# Patient Record
Sex: FEMALE | Race: WHITE | Employment: FULL TIME | URBAN - METROPOLITAN AREA
[De-identification: names, ages, dates, MRNs, and addresses within clinical notes are randomized per-mention and may not be internally consistent; named-entity substitution may affect disease eponyms.]

---

## 2019-02-05 ENCOUNTER — TELEPHONE (OUTPATIENT)
Dept: SCHEDULING | Facility: IMAGING CENTER | Age: 49
End: 2019-02-05

## 2019-03-18 ENCOUNTER — OFFICE VISIT (OUTPATIENT)
Dept: INTERNAL MEDICINE | Facility: MEDICAL CENTER | Age: 49
End: 2019-03-18
Payer: COMMERCIAL

## 2019-03-18 VITALS
HEIGHT: 67 IN | HEART RATE: 70 BPM | OXYGEN SATURATION: 98 % | SYSTOLIC BLOOD PRESSURE: 106 MMHG | DIASTOLIC BLOOD PRESSURE: 60 MMHG | WEIGHT: 171 LBS | TEMPERATURE: 98.2 F | BODY MASS INDEX: 26.84 KG/M2

## 2019-03-18 DIAGNOSIS — Z11.51 SCREENING FOR HUMAN PAPILLOMAVIRUS (HPV): ICD-10-CM

## 2019-03-18 DIAGNOSIS — Z87.898 HISTORY OF PREDIABETES: ICD-10-CM

## 2019-03-18 DIAGNOSIS — Z12.31 BREAST CANCER SCREENING BY MAMMOGRAM: ICD-10-CM

## 2019-03-18 DIAGNOSIS — E03.9 HYPOTHYROIDISM, UNSPECIFIED TYPE: ICD-10-CM

## 2019-03-18 PROCEDURE — 99204 OFFICE O/P NEW MOD 45 MIN: CPT | Mod: GC | Performed by: INTERNAL MEDICINE

## 2019-03-18 RX ORDER — LEVOTHYROXINE SODIUM 137 UG/1
137 TABLET ORAL
Qty: 30 TAB | Refills: 2 | Status: SHIPPED | OUTPATIENT
Start: 2019-03-18

## 2019-03-18 RX ORDER — LEVOTHYROXINE SODIUM 0.12 MG/1
125 TABLET ORAL
Qty: 30 TAB | Refills: 2 | Status: SHIPPED | OUTPATIENT
Start: 2019-03-18

## 2019-03-18 ASSESSMENT — PATIENT HEALTH QUESTIONNAIRE - PHQ9: CLINICAL INTERPRETATION OF PHQ2 SCORE: 0

## 2019-03-18 ASSESSMENT — PAIN SCALES - GENERAL: PAINLEVEL: NO PAIN

## 2019-03-18 NOTE — PROGRESS NOTES
"New Patient to Establish      CC:   PCP switch      HPI:   48-year-old female with hypothyroidism diagnosed 19 years ago after her first pregnancy, when the patient had difficulty getting pregnant again, was started on levothyroxine and had a 2nd pregnancy without any complications. Reports taking her levothyroxine on an empty stomach every morning and waiting 30-60 minutes to eat or drink. Has been levothyroxine 0.125 and 0.137 mcg alternatingly every other day for the past year. Reports having relatively low energy chronically. Endorses history of brittle nails and hair fall, notes some worsening of these symptoms over the past 6 months, had a bout of constipation several weeks ago that has now resolved.    Declines the flu shot.  Denies any illness for the past several years except for cystitis in 2016.  LMP 3/18/19, endorses regular periods every 4 weeks, denies menorrhagia or metrorrhagia, last pap smear was \"a couple of years ago\" and normal, last mammogram 5-8 years ago and normal.  Sees a chiropractor PRN for occasional hip discomfort.  Has a 20-year-old son and an 18-year-old daughter, works in [employer] at the [employer], lives in a house in [place] with her  and 2 children.      ROS:  Negative except for chronic low energy that does not interfere significantly with her daily activities.  Denies low mood, abnormal bleeding or bruising, malaise, sleep disturbance, muscular bone pain, melena, hematochezia, dysuria, hematuria, cough, dysphagia, abnormal lumps or bumps and numbness or tingling.      Patient Active Problem List    Diagnosis Date Noted   • Breast cancer screening by mammogram 03/19/2019   • Hypothyroidism 03/19/2019   • History of prediabetes 03/19/2019   • Screening for human papillomavirus (HPV) 03/19/2019       History reviewed. No pertinent past medical history.    History reviewed. No pertinent surgical history.    Current Outpatient Prescriptions   Medication Sig Dispense Refill   • levothyroxine " "(SYNTHROID) 137 MCG Tab Take 1 Tab by mouth Every morning on an empty stomach. 30 Tab 2   • levothyroxine (SYNTHROID) 125 MCG Tab Take 1 Tab by mouth Every morning on an empty stomach. 30 Tab 2   • LEVOTHYROXINE SODIUM PO Take  by mouth.     • nitrofurantoin monohydr macro (MACROBID) 100 MG Cap Take 1 Cap by mouth 2 times a day. (Patient not taking: Reported on 3/18/2019) 20 Cap 0     No current facility-administered medications for this visit.        Allergies as of 03/18/2019   • (No Known Allergies)       Social History     Social History   • Marital status:      Spouse name: N/A   • Number of children: 2   • Years of education: N/A     Occupational History   • [employer]     Social History Main Topics   • Smoking status: Never Smoker   • Smokeless tobacco: Never Used   • Alcohol use No   • Drug use: No   • Sexual activity: Yes     Partners: Male     Other Topics Concern   • Sleep Concern No   • Stress Concern No   • Special Diet No   • Exercise No     Social History Narrative   • No narrative on file       Family History   Problem Relation Age of Onset   • Heart Attack Mother    • No Known Problems Father        Physical Exam:     /60 (BP Location: Right arm, Patient Position: Sitting, BP Cuff Size: Adult)   Pulse 70   Temp 36.8 °C (98.2 °F) (Temporal)   Ht 1.702 m (5' 7.01\")   Wt 77.6 kg (171 lb)   LMP 03/18/2019 (Exact Date)   SpO2 98%   Breastfeeding? No   BMI 26.77 kg/m²     General: No acute distress, pleasant, cooperative  Head: Normocephalic, atraumatic, nose and BL ears normal  Eyes: EOMI, normal conjunctivae, sclerae anicteric  Throat: Oropharynx clear, oral mucosa moist  Neck: Supple, no lymphadenopathy  Heart: Normal rate, regular rhythm, no murmurs  Lungs: Normal work of breathing, clear to auscultation bilaterally  Abdomen: Normoactive bowel sounds, soft, non-tender, no rebound or guarding  Extremities: No cyanosis, no edema  Neuro: Alert, oriented to " person place and time, CNs grossly intact, no FNDs  Skin: Warm, dry, intact, no suspicious rashes or lesions  Psych: Normal mood, appropriate affect      Note: I have reviewed all pertinent labs and diagnostic tests associated with this visit with specific comments listed under the assessment and plan below      Assessment and Plan:    Problem List Items Addressed This Visit     Breast cancer screening by mammogram     Last mammogram was 5-6 years ago and normal.         Relevant Orders    MA-SCREEN MAMMO W/CAD-BILAT    Hypothyroidism     Diagnosed 19 years ago following her first pregnancy.   Has been well managed overall.  Last TSH was approximately 1 year ago.  Continue levothyroxine 0.125 and 0.137 mcg as prescribed.  Repeat TFTs and follow-up in 3-6 months.         Relevant Medications    levothyroxine (SYNTHROID) 137 MCG Tab    levothyroxine (SYNTHROID) 125 MCG Tab    Other Relevant Orders    TSH WITH REFLEX TO FT4    History of prediabetes     Reports being told she had prediabetes several years ago.  Counseled on healthy lifestyle modifications.  Glycohemoglobin ordered.  Return to clinic in 3-6 months for follow-up.         Relevant Orders    Comp Metabolic Panel    CBC WITH DIFFERENTIAL    LIPID PANEL    HEMOGLOBIN A1C    Screening for human papillomavirus (HPV)     Last Pap smear was several years ago and normal per the patient's report.  Return to clinic in 3-6 months for a women's health visit.               Followup: Return in about 6 months (around 9/18/2019) for Long.    Signed by: Nicolette Lerma M.D.

## 2019-03-18 NOTE — PATIENT INSTRUCTIONS
Ask the  for an appointment with our Women's clinic  Complete the blood tests ordered  Complete the mammogram ordered  Return to clinic in 5 weeks for follow up       Exercising to Lose Weight  Exercising can help you to lose weight. In order to lose weight through exercise, you need to do vigorous-intensity exercise. You can tell that you are exercising with vigorous intensity if you are breathing very hard and fast and cannot hold a conversation while exercising.  Moderate-intensity exercise helps to maintain your current weight. You can tell that you are exercising at a moderate level if you have a higher heart rate and faster breathing, but you are still able to hold a conversation.  HOW OFTEN SHOULD I EXERCISE?  Choose an activity that you enjoy and set realistic goals. Your health care provider can help you to make an activity plan that works for you. Exercise regularly as directed by your health care provider. This may include:  · Doing resistance training twice each week, such as:  ¨ Push-ups.  ¨ Sit-ups.  ¨ Lifting weights.  ¨ Using resistance bands.  · Doing a given intensity of exercise for a given amount of time. Choose from these options:  ¨ 150 minutes of moderate-intensity exercise every week.  ¨ 75 minutes of vigorous-intensity exercise every week.  ¨ A mix of moderate-intensity and vigorous-intensity exercise every week.  Children, pregnant women, people who are out of shape, people who are overweight, and older adults may need to consult a health care provider for individual recommendations. If you have any sort of medical condition, be sure to consult your health care provider before starting a new exercise program.  WHAT ARE SOME ACTIVITIES THAT CAN HELP ME TO LOSE WEIGHT?   · Walking at a rate of at least 4.5 miles an hour.  · Jogging or running at a rate of 5 miles per hour.  · Biking at a rate of at least 10 miles per hour.  · Lap swimming.  · Roller-skating or in-line  skating.  · Cross-country skiing.  · Vigorous competitive sports, such as football, basketball, and soccer.  · Jumping rope.  · Aerobic dancing.  HOW CAN I BE MORE ACTIVE IN MY DAY-TO-DAY ACTIVITIES?  · Use the stairs instead of the elevator.  · Take a walk during your lunch break.  · If you drive, park your car farther away from work or school.  · If you take public transportation, get off one stop early and walk the rest of the way.  · Make all of your phone calls while standing up and walking around.  · Get up, stretch, and walk around every 30 minutes throughout the day.  WHAT GUIDELINES SHOULD I FOLLOW WHILE EXERCISING?  · Do not exercise so much that you hurt yourself, feel dizzy, or get very short of breath.  · Consult your health care provider prior to starting a new exercise program.  · Wear comfortable clothes and shoes with good support.  · Drink plenty of water while you exercise to prevent dehydration or heat stroke. Body water is lost during exercise and must be replaced.  · Work out until you breathe faster and your heart beats faster.     This information is not intended to replace advice given to you by your health care provider. Make sure you discuss any questions you have with your health care provider.     Document Released: 01/20/2012 Document Revised: 01/08/2016 Document Reviewed: 05/21/2015  [employer] Interactive Patient Education ©2016 [employer]

## 2019-03-19 PROBLEM — Z11.51 SCREENING FOR HUMAN PAPILLOMAVIRUS (HPV): Status: ACTIVE | Noted: 2019-03-19

## 2019-03-19 PROBLEM — E03.9 HYPOTHYROIDISM: Status: ACTIVE | Noted: 2019-03-19

## 2019-03-19 PROBLEM — Z12.31 BREAST CANCER SCREENING BY MAMMOGRAM: Status: ACTIVE | Noted: 2019-03-19

## 2019-03-19 PROBLEM — Z87.898 HISTORY OF PREDIABETES: Status: ACTIVE | Noted: 2019-03-19

## 2019-03-19 NOTE — ASSESSMENT & PLAN NOTE
Last Pap smear was several years ago and normal per the patient's report.  Return to clinic in 3-6 months for a women's health visit.

## 2019-03-19 NOTE — ASSESSMENT & PLAN NOTE
Reports being told she had prediabetes several years ago.  Counseled on healthy lifestyle modifications.  Glycohemoglobin ordered.  Return to clinic in 3-6 months for follow-up.

## 2019-03-19 NOTE — ASSESSMENT & PLAN NOTE
Diagnosed 19 years ago following her first pregnancy.   Has been well managed overall.  Last TSH was approximately 1 year ago.  Continue levothyroxine 0.125 and 0.137 mcg as prescribed.  Repeat TFTs and follow-up in 3-6 months.

## 2019-03-30 ENCOUNTER — HOSPITAL ENCOUNTER (OUTPATIENT)
Dept: LAB | Facility: MEDICAL CENTER | Age: 49
End: 2019-03-30
Attending: STUDENT IN AN ORGANIZED HEALTH CARE EDUCATION/TRAINING PROGRAM
Payer: COMMERCIAL

## 2019-03-30 DIAGNOSIS — E03.9 HYPOTHYROIDISM, UNSPECIFIED TYPE: ICD-10-CM

## 2019-03-30 DIAGNOSIS — Z87.898 HISTORY OF PREDIABETES: ICD-10-CM

## 2019-03-30 LAB
ALBUMIN SERPL BCP-MCNC: 4.2 G/DL (ref 3.2–4.9)
ALBUMIN/GLOB SERPL: 2.3 G/DL
ALP SERPL-CCNC: 39 U/L (ref 30–99)
ALT SERPL-CCNC: 26 U/L (ref 2–50)
ANION GAP SERPL CALC-SCNC: 6 MMOL/L (ref 0–11.9)
AST SERPL-CCNC: 18 U/L (ref 12–45)
BASOPHILS # BLD AUTO: 1 % (ref 0–1.8)
BASOPHILS # BLD: 0.07 K/UL (ref 0–0.12)
BILIRUB SERPL-MCNC: 0.4 MG/DL (ref 0.1–1.5)
BUN SERPL-MCNC: 10 MG/DL (ref 8–22)
CALCIUM SERPL-MCNC: 8.7 MG/DL (ref 8.5–10.5)
CHLORIDE SERPL-SCNC: 107 MMOL/L (ref 96–112)
CHOLEST SERPL-MCNC: 172 MG/DL (ref 100–199)
CO2 SERPL-SCNC: 26 MMOL/L (ref 20–33)
CREAT SERPL-MCNC: 0.67 MG/DL (ref 0.5–1.4)
EOSINOPHIL # BLD AUTO: 0.24 K/UL (ref 0–0.51)
EOSINOPHIL NFR BLD: 3.6 % (ref 0–6.9)
ERYTHROCYTE [DISTWIDTH] IN BLOOD BY AUTOMATED COUNT: 36.9 FL (ref 35.9–50)
EST. AVERAGE GLUCOSE BLD GHB EST-MCNC: 108 MG/DL
FASTING STATUS PATIENT QL REPORTED: NORMAL
GLOBULIN SER CALC-MCNC: 1.8 G/DL (ref 1.9–3.5)
GLUCOSE SERPL-MCNC: 108 MG/DL (ref 65–99)
HBA1C MFR BLD: 5.4 % (ref 0–5.6)
HCT VFR BLD AUTO: 43.6 % (ref 37–47)
HDLC SERPL-MCNC: 42 MG/DL
HGB BLD-MCNC: 14.4 G/DL (ref 12–16)
IMM GRANULOCYTES # BLD AUTO: 0.02 K/UL (ref 0–0.11)
IMM GRANULOCYTES NFR BLD AUTO: 0.3 % (ref 0–0.9)
LDLC SERPL CALC-MCNC: 102 MG/DL
LYMPHOCYTES # BLD AUTO: 1.98 K/UL (ref 1–4.8)
LYMPHOCYTES NFR BLD: 29.6 % (ref 22–41)
MCH RBC QN AUTO: 29.6 PG (ref 27–33)
MCHC RBC AUTO-ENTMCNC: 33 G/DL (ref 33.6–35)
MCV RBC AUTO: 89.7 FL (ref 81.4–97.8)
MONOCYTES # BLD AUTO: 0.44 K/UL (ref 0–0.85)
MONOCYTES NFR BLD AUTO: 6.6 % (ref 0–13.4)
NEUTROPHILS # BLD AUTO: 3.93 K/UL (ref 2–7.15)
NEUTROPHILS NFR BLD: 58.9 % (ref 44–72)
NRBC # BLD AUTO: 0 K/UL
NRBC BLD-RTO: 0 /100 WBC
PLATELET # BLD AUTO: 240 K/UL (ref 164–446)
PMV BLD AUTO: 10.8 FL (ref 9–12.9)
POTASSIUM SERPL-SCNC: 4 MMOL/L (ref 3.6–5.5)
PROT SERPL-MCNC: 6 G/DL (ref 6–8.2)
RBC # BLD AUTO: 4.86 M/UL (ref 4.2–5.4)
SODIUM SERPL-SCNC: 139 MMOL/L (ref 135–145)
T4 FREE SERPL-MCNC: 1.36 NG/DL (ref 0.53–1.43)
TRIGL SERPL-MCNC: 141 MG/DL (ref 0–149)
TSH SERPL DL<=0.005 MIU/L-ACNC: 0.01 UIU/ML (ref 0.38–5.33)
WBC # BLD AUTO: 6.7 K/UL (ref 4.8–10.8)

## 2019-03-30 PROCEDURE — 84439 ASSAY OF FREE THYROXINE: CPT

## 2019-03-30 PROCEDURE — 83036 HEMOGLOBIN GLYCOSYLATED A1C: CPT

## 2019-03-30 PROCEDURE — 80061 LIPID PANEL: CPT

## 2019-03-30 PROCEDURE — 84443 ASSAY THYROID STIM HORMONE: CPT

## 2019-03-30 PROCEDURE — 36415 COLL VENOUS BLD VENIPUNCTURE: CPT

## 2019-03-30 PROCEDURE — 85025 COMPLETE CBC W/AUTO DIFF WBC: CPT

## 2019-03-30 PROCEDURE — 80053 COMPREHEN METABOLIC PANEL: CPT

## 2019-06-29 ENCOUNTER — OFFICE VISIT (OUTPATIENT)
Dept: URGENT CARE | Facility: PHYSICIAN GROUP | Age: 49
End: 2019-06-29
Payer: COMMERCIAL

## 2019-06-29 VITALS
HEART RATE: 61 BPM | SYSTOLIC BLOOD PRESSURE: 122 MMHG | BODY MASS INDEX: 27.78 KG/M2 | OXYGEN SATURATION: 98 % | TEMPERATURE: 97.6 F | DIASTOLIC BLOOD PRESSURE: 76 MMHG | RESPIRATION RATE: 14 BRPM | WEIGHT: 177 LBS | HEIGHT: 67 IN

## 2019-06-29 DIAGNOSIS — K13.0 ANGULAR CHEILITIS: Primary | ICD-10-CM

## 2019-06-29 PROCEDURE — 99214 OFFICE O/P EST MOD 30 MIN: CPT | Performed by: PHYSICIAN ASSISTANT

## 2019-06-29 RX ORDER — NYSTATIN 100000 U/G
CREAM TOPICAL
Qty: 1 TUBE | Refills: 1 | Status: SHIPPED | OUTPATIENT
Start: 2019-06-29 | End: 2019-07-05

## 2019-06-29 RX ORDER — TRIAMCINOLONE ACETONIDE 1 MG/G
CREAM TOPICAL
Qty: 1 TUBE | Refills: 1 | Status: SHIPPED | OUTPATIENT
Start: 2019-06-29 | End: 2019-07-05

## 2019-06-29 NOTE — PROGRESS NOTES
Subjective:      Olga Valdes is a 48 y.o. female who presents with Rash (Skin irritation around mouth/ x1wk)    PMH:  has no past medical history of Anxiety; Depression; Diabetes (HCC); High cholesterol; Hypertension; or Seizures (HCC).  MEDS:   Current Outpatient Prescriptions:   •  triamcinolone acetonide (KENALOG) 0.1 % Cream, Apply thin layer to skin as directed 2 times daily., Disp: 1 Tube, Rfl: 1  •  nystatin (MYCOSTATIN) 934455 UNIT/GM Cream topical cream, Apply to skin as directed 2 times daily for up to 7 days., Disp: 1 Tube, Rfl: 1  •  levothyroxine (SYNTHROID) 125 MCG Tab, Take 1 Tab by mouth Every morning on an empty stomach., Disp: 30 Tab, Rfl: 2  •  levothyroxine (SYNTHROID) 137 MCG Tab, Take 1 Tab by mouth Every morning on an empty stomach., Disp: 30 Tab, Rfl: 2  ALLERGIES: No Known Allergies  SURGHX: History reviewed. No pertinent surgical history.  SOCHX:  reports that she has never smoked. She has never used smokeless tobacco. She reports that she does not drink alcohol or use drugs.  FH: Reviewed with patient, not pertinent to this visit.           Patient presents with:  Rash: Skin irritation around mouth/ x1-2 weeks.  PT states it started out like chapped lips or eczema, and she was using triamcinolone cream with some improvement, then she ran out.  PT states since she ran out, she has been using chap stick, vaseline, lotion without relief.  In fact now she has  cracks in the corners of her mouth. Pt denies honey colored crusts around mouth or from lesions.  Pt denies blisters or history of cold sores.  Pt denies any other complaint.     Rash   This is a new problem. Episode onset: 2 weeks. The problem has been gradually worsening since onset. The affected locations include the lips and face. The rash is characterized by burning, dryness, pain, redness and scaling. She was exposed to nothing. Pertinent negatives include no congestion, cough, fever or sore throat. Past treatments include  "topical steroids, cold compress and moisturizer. The treatment provided mild relief. Her past medical history is significant for eczema. There is no history of varicella.       Review of Systems   Constitutional: Negative for fever.   HENT: Negative for congestion and sore throat.    Respiratory: Negative for cough.    Skin: Positive for rash.   All other systems reviewed and are negative.         Objective:     /76 (BP Location: Right arm, Patient Position: Sitting, BP Cuff Size: Adult)   Pulse 61   Temp 36.4 °C (97.6 °F) (Temporal)   Resp 14   Ht 1.702 m (5' 7\")   Wt 80.3 kg (177 lb)   SpO2 98%   BMI 27.72 kg/m²      Physical Exam   Constitutional: She is oriented to person, place, and time. She appears well-developed and well-nourished.   HENT:   Head: Normocephalic and atraumatic.       Nose: Nose normal.   Eyes: Pupils are equal, round, and reactive to light. Conjunctivae and EOM are normal.   Neck: Normal range of motion. Neck supple.   Cardiovascular: Normal rate, regular rhythm and normal heart sounds.    Pulmonary/Chest: Effort normal and breath sounds normal.   Abdominal: Soft.   Musculoskeletal: Normal range of motion.   Neurological: She is alert and oriented to person, place, and time. Gait normal.   Skin: Skin is warm and dry. Capillary refill takes less than 2 seconds.   Psychiatric: She has a normal mood and affect.   Nursing note and vitals reviewed.              Assessment/Plan:     1. Angular cheilitis  triamcinolone acetonide (KENALOG) 0.1 % Cream    nystatin (MYCOSTATIN) 501133 UNIT/GM Cream topical cream     PT apply nystatin for a few days first, then can add  triamcinolone if no improvement.      PT should follow up with PCP in 1-2 days for re-evaluation if symptoms have not improved.  Discussed red flags and reasons to return to UC or ED.  Pt and/or family verbalized understanding of diagnosis and follow up instructions and was offered informational handout on diagnosis.  PT " discharged.

## 2019-06-30 ASSESSMENT — ENCOUNTER SYMPTOMS
COUGH: 0
FEVER: 0
SORE THROAT: 0

## 2021-01-01 ENCOUNTER — APPOINTMENT (OUTPATIENT)
Dept: RADIOLOGY | Facility: MEDICAL CENTER | Age: 51
DRG: 166 | End: 2021-01-01
Attending: INTERNAL MEDICINE
Payer: COMMERCIAL

## 2021-01-01 ENCOUNTER — APPOINTMENT (OUTPATIENT)
Dept: CARDIOLOGY | Facility: MEDICAL CENTER | Age: 51
DRG: 166 | End: 2021-01-01
Attending: INTERNAL MEDICINE
Payer: COMMERCIAL

## 2021-01-01 ENCOUNTER — APPOINTMENT (OUTPATIENT)
Dept: RADIOLOGY | Facility: MEDICAL CENTER | Age: 51
DRG: 166 | End: 2021-01-01
Attending: EMERGENCY MEDICINE
Payer: COMMERCIAL

## 2021-01-01 ENCOUNTER — APPOINTMENT (OUTPATIENT)
Dept: RADIOLOGY | Facility: MEDICAL CENTER | Age: 51
DRG: 166 | End: 2021-01-01
Attending: HOSPITALIST
Payer: COMMERCIAL

## 2021-01-01 ENCOUNTER — APPOINTMENT (OUTPATIENT)
Dept: URGENT CARE | Facility: PHYSICIAN GROUP | Age: 51
End: 2021-01-01
Payer: COMMERCIAL

## 2021-01-01 ENCOUNTER — HOSPITAL ENCOUNTER (INPATIENT)
Facility: MEDICAL CENTER | Age: 51
LOS: 9 days | DRG: 166 | End: 2021-06-25
Attending: EMERGENCY MEDICINE | Admitting: HOSPITALIST
Payer: COMMERCIAL

## 2021-01-01 ENCOUNTER — APPOINTMENT (OUTPATIENT)
Dept: RADIOLOGY | Facility: MEDICAL CENTER | Age: 51
DRG: 166 | End: 2021-01-01
Attending: RADIOLOGY
Payer: COMMERCIAL

## 2021-01-01 VITALS
SYSTOLIC BLOOD PRESSURE: 90 MMHG | HEART RATE: 74 BPM | HEIGHT: 67 IN | DIASTOLIC BLOOD PRESSURE: 80 MMHG | WEIGHT: 167.11 LBS | RESPIRATION RATE: 38 BRPM | BODY MASS INDEX: 26.23 KG/M2 | OXYGEN SATURATION: 15 % | TEMPERATURE: 97.7 F

## 2021-01-01 DIAGNOSIS — U07.1 ACUTE HYPOXEMIC RESPIRATORY FAILURE DUE TO COVID-19 (HCC): ICD-10-CM

## 2021-01-01 DIAGNOSIS — R57.0 CARDIOGENIC SHOCK (HCC): ICD-10-CM

## 2021-01-01 DIAGNOSIS — U07.1 COVID-19: ICD-10-CM

## 2021-01-01 DIAGNOSIS — R57.8 HEMORRHAGIC SHOCK (HCC): ICD-10-CM

## 2021-01-01 DIAGNOSIS — J96.01 ACUTE HYPOXEMIC RESPIRATORY FAILURE DUE TO COVID-19 (HCC): ICD-10-CM

## 2021-01-01 DIAGNOSIS — N17.9 AKI (ACUTE KIDNEY INJURY) (HCC): ICD-10-CM

## 2021-01-01 LAB
ABO + RH BLD: NORMAL
ABO GROUP BLD: NORMAL
ALBUMIN SERPL BCP-MCNC: 1.1 G/DL (ref 3.2–4.9)
ALBUMIN SERPL BCP-MCNC: 1.4 G/DL (ref 3.2–4.9)
ALBUMIN SERPL BCP-MCNC: 2.1 G/DL (ref 3.2–4.9)
ALBUMIN SERPL BCP-MCNC: 2.9 G/DL (ref 3.2–4.9)
ALBUMIN SERPL BCP-MCNC: 2.9 G/DL (ref 3.2–4.9)
ALBUMIN SERPL BCP-MCNC: 3 G/DL (ref 3.2–4.9)
ALBUMIN SERPL BCP-MCNC: 3.1 G/DL (ref 3.2–4.9)
ALBUMIN SERPL BCP-MCNC: 3.2 G/DL (ref 3.2–4.9)
ALBUMIN SERPL BCP-MCNC: 3.2 G/DL (ref 3.2–4.9)
ALBUMIN SERPL BCP-MCNC: 3.3 G/DL (ref 3.2–4.9)
ALBUMIN/GLOB SERPL: 0.8 G/DL
ALBUMIN/GLOB SERPL: 0.9 G/DL
ALBUMIN/GLOB SERPL: 1 G/DL
ALBUMIN/GLOB SERPL: 1.1 G/DL
ALBUMIN/GLOB SERPL: 1.1 G/DL
ALBUMIN/GLOB SERPL: 1.2 G/DL
ALBUMIN/GLOB SERPL: 1.3 G/DL
ALBUMIN/GLOB SERPL: 1.4 G/DL
ALBUMIN/GLOB SERPL: ABNORMAL G/DL
ALBUMIN/GLOB SERPL: ABNORMAL G/DL
ALP SERPL-CCNC: 104 U/L (ref 30–99)
ALP SERPL-CCNC: 17 U/L (ref 30–99)
ALP SERPL-CCNC: 32 U/L (ref 30–99)
ALP SERPL-CCNC: 35 U/L (ref 30–99)
ALP SERPL-CCNC: 35 U/L (ref 30–99)
ALP SERPL-CCNC: 37 U/L (ref 30–99)
ALP SERPL-CCNC: 38 U/L (ref 30–99)
ALP SERPL-CCNC: 40 U/L (ref 30–99)
ALP SERPL-CCNC: 43 U/L (ref 30–99)
ALP SERPL-CCNC: 73 U/L (ref 30–99)
ALT SERPL-CCNC: 28 U/L (ref 2–50)
ALT SERPL-CCNC: 38 U/L (ref 2–50)
ALT SERPL-CCNC: 46 U/L (ref 2–50)
ALT SERPL-CCNC: 48 U/L (ref 2–50)
ALT SERPL-CCNC: 57 U/L (ref 2–50)
ALT SERPL-CCNC: 60 U/L (ref 2–50)
ALT SERPL-CCNC: 61 U/L (ref 2–50)
ALT SERPL-CCNC: 6425 U/L (ref 2–50)
ALT SERPL-CCNC: 69 U/L (ref 2–50)
ALT SERPL-CCNC: 894 U/L (ref 2–50)
ANION GAP SERPL CALC-SCNC: 10 MMOL/L (ref 7–16)
ANION GAP SERPL CALC-SCNC: 11 MMOL/L (ref 7–16)
ANION GAP SERPL CALC-SCNC: 12 MMOL/L (ref 7–16)
ANION GAP SERPL CALC-SCNC: 14 MMOL/L (ref 7–16)
ANION GAP SERPL CALC-SCNC: 14 MMOL/L (ref 7–16)
ANION GAP SERPL CALC-SCNC: 17 MMOL/L (ref 7–16)
ANION GAP SERPL CALC-SCNC: 35 MMOL/L (ref 7–16)
ANION GAP SERPL CALC-SCNC: 42 MMOL/L (ref 7–16)
ANION GAP SERPL CALC-SCNC: 44 MMOL/L (ref 7–16)
ANION GAP SERPL CALC-SCNC: 46 MMOL/L (ref 7–16)
ANION GAP SERPL CALC-SCNC: 47 MMOL/L (ref 7–16)
ANION GAP SERPL CALC-SCNC: 6 MMOL/L (ref 7–16)
ANION GAP SERPL CALC-SCNC: 8 MMOL/L (ref 7–16)
ANION GAP SERPL CALC-SCNC: 9 MMOL/L (ref 7–16)
ANION GAP SERPL CALC-SCNC: 9 MMOL/L (ref 7–16)
APTT PPP: 128.2 SEC (ref 24.7–36)
APTT PPP: 155.6 SEC (ref 24.7–36)
APTT PPP: 239.8 SEC (ref 24.7–36)
APTT PPP: >240 SEC (ref 24.7–36)
AST SERPL-CCNC: 112 U/L (ref 12–45)
AST SERPL-CCNC: 128 U/L (ref 12–45)
AST SERPL-CCNC: 1461 U/L (ref 12–45)
AST SERPL-CCNC: 36 U/L (ref 12–45)
AST SERPL-CCNC: 36 U/L (ref 12–45)
AST SERPL-CCNC: 73 U/L (ref 12–45)
AST SERPL-CCNC: 75 U/L (ref 12–45)
AST SERPL-CCNC: 86 U/L (ref 12–45)
AST SERPL-CCNC: 96 U/L (ref 12–45)
AST SERPL-CCNC: >7000 U/L (ref 12–45)
BACTERIA BLD CULT: NORMAL
BACTERIA BLD CULT: NORMAL
BARCODED ABORH UBTYP: 2800
BARCODED ABORH UBTYP: 5100
BARCODED ABORH UBTYP: 6200
BARCODED ABORH UBTYP: 7300
BARCODED ABORH UBTYP: 7300
BARCODED ABORH UBTYP: 8400
BARCODED ABORH UBTYP: 9500
BARCODED ABORH UBTYP: 9500
BARCODED PRD CODE UBPRD: NORMAL
BARCODED UNIT NUM UBUNT: NORMAL
BASE EXCESS BLDA CALC-SCNC: -10 MMOL/L (ref -4–3)
BASE EXCESS BLDA CALC-SCNC: -13 MMOL/L (ref -4–3)
BASE EXCESS BLDA CALC-SCNC: -14 MMOL/L (ref -4–3)
BASE EXCESS BLDA CALC-SCNC: -15 MMOL/L (ref -4–3)
BASE EXCESS BLDA CALC-SCNC: -16 MMOL/L (ref -4–3)
BASE EXCESS BLDA CALC-SCNC: -16 MMOL/L (ref -4–3)
BASE EXCESS BLDA CALC-SCNC: -17 MMOL/L (ref -4–3)
BASE EXCESS BLDA CALC-SCNC: -19 MMOL/L (ref -4–3)
BASE EXCESS BLDA CALC-SCNC: -19 MMOL/L (ref -4–3)
BASE EXCESS BLDA CALC-SCNC: -20 MMOL/L (ref -4–3)
BASOPHILS # BLD AUTO: 0 % (ref 0–1.8)
BASOPHILS # BLD AUTO: 0.1 % (ref 0–1.8)
BASOPHILS # BLD AUTO: 0.2 % (ref 0–1.8)
BASOPHILS # BLD AUTO: 0.3 % (ref 0–1.8)
BASOPHILS # BLD AUTO: 0.3 % (ref 0–1.8)
BASOPHILS # BLD: 0 K/UL (ref 0–0.12)
BASOPHILS # BLD: 0.01 K/UL (ref 0–0.12)
BASOPHILS # BLD: 0.01 K/UL (ref 0–0.12)
BASOPHILS # BLD: 0.03 K/UL (ref 0–0.12)
BASOPHILS # BLD: 0.05 K/UL (ref 0–0.12)
BILIRUB SERPL-MCNC: 0.3 MG/DL (ref 0.1–1.5)
BILIRUB SERPL-MCNC: 0.4 MG/DL (ref 0.1–1.5)
BILIRUB SERPL-MCNC: 0.5 MG/DL (ref 0.1–1.5)
BILIRUB SERPL-MCNC: 0.6 MG/DL (ref 0.1–1.5)
BILIRUB SERPL-MCNC: 0.8 MG/DL (ref 0.1–1.5)
BILIRUB SERPL-MCNC: 1.3 MG/DL (ref 0.1–1.5)
BILIRUB SERPL-MCNC: 1.9 MG/DL (ref 0.1–1.5)
BLD GP AB SCN SERPL QL: NORMAL
BODY TEMPERATURE: ABNORMAL DEGREES
BREATHS SETTING VENT: 24
BREATHS SETTING VENT: 30
BREATHS SETTING VENT: 30
BREATHS SETTING VENT: 36
BREATHS SETTING VENT: 38
BUN SERPL-MCNC: 10 MG/DL (ref 8–22)
BUN SERPL-MCNC: 14 MG/DL (ref 8–22)
BUN SERPL-MCNC: 15 MG/DL (ref 8–22)
BUN SERPL-MCNC: 16 MG/DL (ref 8–22)
BUN SERPL-MCNC: 17 MG/DL (ref 8–22)
BUN SERPL-MCNC: 22 MG/DL (ref 8–22)
BUN SERPL-MCNC: 23 MG/DL (ref 8–22)
BUN SERPL-MCNC: 23 MG/DL (ref 8–22)
BUN SERPL-MCNC: 26 MG/DL (ref 8–22)
BUN SERPL-MCNC: 27 MG/DL (ref 8–22)
BUN SERPL-MCNC: 28 MG/DL (ref 8–22)
BURR CELLS BLD QL SMEAR: NORMAL
BURR CELLS BLD QL SMEAR: NORMAL
CA-I BLD ISE-SCNC: 0.97 MMOL/L (ref 1.1–1.3)
CA-I BLD ISE-SCNC: 0.98 MMOL/L (ref 1.1–1.3)
CA-I BLD ISE-SCNC: 1.03 MMOL/L (ref 1.1–1.3)
CA-I BLD ISE-SCNC: 1.05 MMOL/L (ref 1.1–1.3)
CA-I BLD ISE-SCNC: 1.13 MMOL/L (ref 1.1–1.3)
CA-I SERPL-SCNC: 1 MMOL/L (ref 1.1–1.3)
CALCIUM SERPL-MCNC: 10.1 MG/DL (ref 8.5–10.5)
CALCIUM SERPL-MCNC: 3.6 MG/DL (ref 8.5–10.5)
CALCIUM SERPL-MCNC: 6.9 MG/DL (ref 8.5–10.5)
CALCIUM SERPL-MCNC: 7.5 MG/DL (ref 8.5–10.5)
CALCIUM SERPL-MCNC: 7.9 MG/DL (ref 8.5–10.5)
CALCIUM SERPL-MCNC: 8 MG/DL (ref 8.5–10.5)
CALCIUM SERPL-MCNC: 8.1 MG/DL (ref 8.5–10.5)
CALCIUM SERPL-MCNC: 8.2 MG/DL (ref 8.5–10.5)
CALCIUM SERPL-MCNC: 8.4 MG/DL (ref 8.5–10.5)
CALCIUM SERPL-MCNC: 8.4 MG/DL (ref 8.5–10.5)
CALCIUM SERPL-MCNC: 8.7 MG/DL (ref 8.5–10.5)
CALCIUM SERPL-MCNC: 8.8 MG/DL (ref 8.5–10.5)
CALCIUM SERPL-MCNC: 9 MG/DL (ref 8.5–10.5)
CFT BLD TEG: 12.4 MIN (ref 5–10)
CFT BLD TEG: 15.8 MIN (ref 5–10)
CFT P HPASE BLD TEG: 13.4 MIN (ref 5–10)
CHLORIDE SERPL-SCNC: 100 MMOL/L (ref 96–112)
CHLORIDE SERPL-SCNC: 123 MMOL/L (ref 96–112)
CHLORIDE SERPL-SCNC: 89 MMOL/L (ref 96–112)
CHLORIDE SERPL-SCNC: 93 MMOL/L (ref 96–112)
CHLORIDE SERPL-SCNC: 94 MMOL/L (ref 96–112)
CHLORIDE SERPL-SCNC: 95 MMOL/L (ref 96–112)
CHLORIDE SERPL-SCNC: 96 MMOL/L (ref 96–112)
CHLORIDE SERPL-SCNC: 96 MMOL/L (ref 96–112)
CLOT ANGLE BLD TEG: 28.1 DEGREES (ref 53–72)
CLOT ANGLE BLD TEG: 34.3 DEGREES (ref 53–72)
CLOT ANGLE P HPASE BLD TEG: 15 DEGREES (ref 53–72)
CLOT INIT P HPASE BLD TEG: 7.8 MIN (ref 1–3)
CLOT LYSIS 30M P MA LENFR BLD TEG: 0 % (ref 0–8)
CO2 BLDA-SCNC: 11 MMOL/L (ref 20–33)
CO2 BLDA-SCNC: 12 MMOL/L (ref 20–33)
CO2 BLDA-SCNC: 13 MMOL/L (ref 20–33)
CO2 BLDA-SCNC: 14 MMOL/L (ref 20–33)
CO2 BLDA-SCNC: 14 MMOL/L (ref 20–33)
CO2 BLDA-SCNC: 15 MMOL/L (ref 20–33)
CO2 BLDA-SCNC: 16 MMOL/L (ref 20–33)
CO2 BLDA-SCNC: 22 MMOL/L (ref 20–33)
CO2 SERPL-SCNC: 10 MMOL/L (ref 20–33)
CO2 SERPL-SCNC: 11 MMOL/L (ref 20–33)
CO2 SERPL-SCNC: 12 MMOL/L (ref 20–33)
CO2 SERPL-SCNC: 12 MMOL/L (ref 20–33)
CO2 SERPL-SCNC: 18 MMOL/L (ref 20–33)
CO2 SERPL-SCNC: 21 MMOL/L (ref 20–33)
CO2 SERPL-SCNC: 22 MMOL/L (ref 20–33)
CO2 SERPL-SCNC: 26 MMOL/L (ref 20–33)
CO2 SERPL-SCNC: 27 MMOL/L (ref 20–33)
CO2 SERPL-SCNC: 27 MMOL/L (ref 20–33)
CO2 SERPL-SCNC: 29 MMOL/L (ref 20–33)
CO2 SERPL-SCNC: 30 MMOL/L (ref 20–33)
CO2 SERPL-SCNC: 31 MMOL/L (ref 20–33)
CO2 SERPL-SCNC: 32 MMOL/L (ref 20–33)
CO2 SERPL-SCNC: 33 MMOL/L (ref 20–33)
CO2 SERPL-SCNC: 35 MMOL/L (ref 20–33)
CO2 SERPL-SCNC: 9 MMOL/L (ref 20–33)
COMPONENT CT 8504CT: NORMAL
COMPONENT F 8504F: NORMAL
COMPONENT P 8504P: NORMAL
COMPONENT R 8504R: NORMAL
CREAT SERPL-MCNC: 0.32 MG/DL (ref 0.5–1.4)
CREAT SERPL-MCNC: 0.55 MG/DL (ref 0.5–1.4)
CREAT SERPL-MCNC: 0.55 MG/DL (ref 0.5–1.4)
CREAT SERPL-MCNC: 0.57 MG/DL (ref 0.5–1.4)
CREAT SERPL-MCNC: 0.59 MG/DL (ref 0.5–1.4)
CREAT SERPL-MCNC: 0.62 MG/DL (ref 0.5–1.4)
CREAT SERPL-MCNC: 0.63 MG/DL (ref 0.5–1.4)
CREAT SERPL-MCNC: 0.66 MG/DL (ref 0.5–1.4)
CREAT SERPL-MCNC: 0.69 MG/DL (ref 0.5–1.4)
CREAT SERPL-MCNC: 0.71 MG/DL (ref 0.5–1.4)
CREAT SERPL-MCNC: 0.72 MG/DL (ref 0.5–1.4)
CREAT SERPL-MCNC: 0.86 MG/DL (ref 0.5–1.4)
CREAT SERPL-MCNC: 1.9 MG/DL (ref 0.5–1.4)
CREAT SERPL-MCNC: 1.93 MG/DL (ref 0.5–1.4)
CREAT SERPL-MCNC: 2.09 MG/DL (ref 0.5–1.4)
CREAT SERPL-MCNC: 2.12 MG/DL (ref 0.5–1.4)
CREAT SERPL-MCNC: 2.43 MG/DL (ref 0.5–1.4)
CRP SERPL HS-MCNC: 0.51 MG/DL (ref 0–0.75)
CRP SERPL HS-MCNC: 0.55 MG/DL (ref 0–0.75)
CRP SERPL HS-MCNC: 1.58 MG/DL (ref 0–0.75)
CRP SERPL HS-MCNC: 2.73 MG/DL (ref 0–0.75)
CRP SERPL HS-MCNC: 3.23 MG/DL (ref 0–0.75)
CT.EXTRINSIC BLD ROTEM: 4.8 MIN (ref 1–3)
CT.EXTRINSIC BLD ROTEM: 7.9 MIN (ref 1–3)
D DIMER PPP IA.FEU-MCNC: 1.05 UG/ML (FEU) (ref 0–0.5)
D DIMER PPP IA.FEU-MCNC: 2.77 UG/ML (FEU) (ref 0–0.5)
DELSYS IDSYS: ABNORMAL
END TIDAL CARBON DIOXIDE IECO2: 14 MMHG
END TIDAL CARBON DIOXIDE IECO2: 18 MMHG
END TIDAL CARBON DIOXIDE IECO2: 21 MMHG
END TIDAL CARBON DIOXIDE IECO2: 28 MMHG
END TIDAL CARBON DIOXIDE IECO2: 33 MMHG
END TIDAL CARBON DIOXIDE IECO2: 35 MMHG
EOSINOPHIL # BLD AUTO: 0 K/UL (ref 0–0.51)
EOSINOPHIL # BLD AUTO: 0.02 K/UL (ref 0–0.51)
EOSINOPHIL # BLD AUTO: 0.02 K/UL (ref 0–0.51)
EOSINOPHIL # BLD AUTO: 0.03 K/UL (ref 0–0.51)
EOSINOPHIL # BLD AUTO: 0.21 K/UL (ref 0–0.51)
EOSINOPHIL NFR BLD: 0 % (ref 0–6.9)
EOSINOPHIL NFR BLD: 0.2 % (ref 0–6.9)
EOSINOPHIL NFR BLD: 0.2 % (ref 0–6.9)
EOSINOPHIL NFR BLD: 0.3 % (ref 0–6.9)
EOSINOPHIL NFR BLD: 0.8 % (ref 0–6.9)
ERYTHROCYTE [DISTWIDTH] IN BLOOD BY AUTOMATED COUNT: 37.4 FL (ref 35.9–50)
ERYTHROCYTE [DISTWIDTH] IN BLOOD BY AUTOMATED COUNT: 38.1 FL (ref 35.9–50)
ERYTHROCYTE [DISTWIDTH] IN BLOOD BY AUTOMATED COUNT: 38.5 FL (ref 35.9–50)
ERYTHROCYTE [DISTWIDTH] IN BLOOD BY AUTOMATED COUNT: 38.5 FL (ref 35.9–50)
ERYTHROCYTE [DISTWIDTH] IN BLOOD BY AUTOMATED COUNT: 39.1 FL (ref 35.9–50)
ERYTHROCYTE [DISTWIDTH] IN BLOOD BY AUTOMATED COUNT: 39.5 FL (ref 35.9–50)
ERYTHROCYTE [DISTWIDTH] IN BLOOD BY AUTOMATED COUNT: 39.6 FL (ref 35.9–50)
ERYTHROCYTE [DISTWIDTH] IN BLOOD BY AUTOMATED COUNT: 40.2 FL (ref 35.9–50)
ERYTHROCYTE [DISTWIDTH] IN BLOOD BY AUTOMATED COUNT: 45.3 FL (ref 35.9–50)
ERYTHROCYTE [DISTWIDTH] IN BLOOD BY AUTOMATED COUNT: 46.9 FL (ref 35.9–50)
ERYTHROCYTE [DISTWIDTH] IN BLOOD BY AUTOMATED COUNT: 52.8 FL (ref 35.9–50)
ERYTHROCYTE [DISTWIDTH] IN BLOOD BY AUTOMATED COUNT: 53 FL (ref 35.9–50)
ERYTHROCYTE [DISTWIDTH] IN BLOOD BY AUTOMATED COUNT: 55.2 FL (ref 35.9–50)
FIBRINOGEN PPP-MCNC: 145 MG/DL (ref 215–460)
FIBRINOGEN PPP-MCNC: 61 MG/DL (ref 215–460)
FIBRINOGEN PPP-MCNC: 79 MG/DL (ref 215–460)
FLUAV RNA SPEC QL NAA+PROBE: NEGATIVE
FLUBV RNA SPEC QL NAA+PROBE: NEGATIVE
GLOBULIN SER CALC-MCNC: 1.5 G/DL (ref 1.9–3.5)
GLOBULIN SER CALC-MCNC: 2.4 G/DL (ref 1.9–3.5)
GLOBULIN SER CALC-MCNC: 2.7 G/DL (ref 1.9–3.5)
GLOBULIN SER CALC-MCNC: 2.9 G/DL (ref 1.9–3.5)
GLOBULIN SER CALC-MCNC: 2.9 G/DL (ref 1.9–3.5)
GLOBULIN SER CALC-MCNC: 3.2 G/DL (ref 1.9–3.5)
GLOBULIN SER CALC-MCNC: 3.2 G/DL (ref 1.9–3.5)
GLOBULIN SER CALC-MCNC: 3.6 G/DL (ref 1.9–3.5)
GLOBULIN SER CALC-MCNC: ABNORMAL G/DL (ref 1.9–3.5)
GLOBULIN SER CALC-MCNC: ABNORMAL G/DL (ref 1.9–3.5)
GLUCOSE BLD-MCNC: 165 MG/DL (ref 65–99)
GLUCOSE BLD-MCNC: 184 MG/DL (ref 65–99)
GLUCOSE BLD-MCNC: 188 MG/DL (ref 65–99)
GLUCOSE BLD-MCNC: 193 MG/DL (ref 65–99)
GLUCOSE BLD-MCNC: 196 MG/DL (ref 65–99)
GLUCOSE BLD-MCNC: 197 MG/DL (ref 65–99)
GLUCOSE BLD-MCNC: 214 MG/DL (ref 65–99)
GLUCOSE BLD-MCNC: 226 MG/DL (ref 65–99)
GLUCOSE BLD-MCNC: 256 MG/DL (ref 65–99)
GLUCOSE BLD-MCNC: 262 MG/DL (ref 65–99)
GLUCOSE BLD-MCNC: 289 MG/DL (ref 65–99)
GLUCOSE SERPL-MCNC: 129 MG/DL (ref 65–99)
GLUCOSE SERPL-MCNC: 144 MG/DL (ref 65–99)
GLUCOSE SERPL-MCNC: 150 MG/DL (ref 65–99)
GLUCOSE SERPL-MCNC: 150 MG/DL (ref 65–99)
GLUCOSE SERPL-MCNC: 151 MG/DL (ref 65–99)
GLUCOSE SERPL-MCNC: 151 MG/DL (ref 65–99)
GLUCOSE SERPL-MCNC: 164 MG/DL (ref 65–99)
GLUCOSE SERPL-MCNC: 165 MG/DL (ref 65–99)
GLUCOSE SERPL-MCNC: 174 MG/DL (ref 65–99)
GLUCOSE SERPL-MCNC: 183 MG/DL (ref 65–99)
GLUCOSE SERPL-MCNC: 221 MG/DL (ref 65–99)
GLUCOSE SERPL-MCNC: 249 MG/DL (ref 65–99)
GLUCOSE SERPL-MCNC: 255 MG/DL (ref 65–99)
GLUCOSE SERPL-MCNC: 316 MG/DL (ref 65–99)
GLUCOSE SERPL-MCNC: 365 MG/DL (ref 65–99)
GLUCOSE SERPL-MCNC: 568 MG/DL (ref 65–99)
GLUCOSE SERPL-MCNC: 79 MG/DL (ref 65–99)
HAV IGM SERPL QL IA: NORMAL
HBV CORE IGM SER QL: NORMAL
HBV SURFACE AG SER QL: NORMAL
HCG SERPL QL: NEGATIVE
HCO3 BLDA-SCNC: 10.1 MMOL/L (ref 17–25)
HCO3 BLDA-SCNC: 10.8 MMOL/L (ref 17–25)
HCO3 BLDA-SCNC: 11.5 MMOL/L (ref 17–25)
HCO3 BLDA-SCNC: 12.5 MMOL/L (ref 17–25)
HCO3 BLDA-SCNC: 12.8 MMOL/L (ref 17–25)
HCO3 BLDA-SCNC: 13.2 MMOL/L (ref 17–25)
HCO3 BLDA-SCNC: 15 MMOL/L (ref 17–25)
HCO3 BLDA-SCNC: 19.2 MMOL/L (ref 17–25)
HCO3 BLDA-SCNC: 19.5 MMOL/L (ref 17–25)
HCO3 BLDA-SCNC: 20.5 MMOL/L (ref 17–25)
HCT VFR BLD AUTO: 18.3 % (ref 37–47)
HCT VFR BLD AUTO: 21.7 % (ref 37–47)
HCT VFR BLD AUTO: 26.9 % (ref 37–47)
HCT VFR BLD AUTO: 31 % (ref 37–47)
HCT VFR BLD AUTO: 31.4 % (ref 37–47)
HCT VFR BLD AUTO: 41.2 % (ref 37–47)
HCT VFR BLD AUTO: 43.7 % (ref 37–47)
HCT VFR BLD AUTO: 45.2 % (ref 37–47)
HCT VFR BLD AUTO: 47 % (ref 37–47)
HCT VFR BLD AUTO: 47.4 % (ref 37–47)
HCT VFR BLD AUTO: 47.6 % (ref 37–47)
HCT VFR BLD AUTO: 47.6 % (ref 37–47)
HCT VFR BLD AUTO: 49.4 % (ref 37–47)
HCT VFR BLD CALC: 24 % (ref 37–47)
HCT VFR BLD CALC: 25 % (ref 37–47)
HCT VFR BLD CALC: 42 % (ref 37–47)
HCT VFR BLD CALC: <15 % (ref 37–47)
HCT VFR BLD CALC: <15 % (ref 37–47)
HCV AB SER QL: NORMAL
HGB BLD-MCNC: 10 G/DL (ref 12–16)
HGB BLD-MCNC: 14.1 G/DL (ref 12–16)
HGB BLD-MCNC: 14.3 G/DL (ref 12–16)
HGB BLD-MCNC: 14.9 G/DL (ref 12–16)
HGB BLD-MCNC: 15.1 G/DL (ref 12–16)
HGB BLD-MCNC: 15.7 G/DL (ref 12–16)
HGB BLD-MCNC: 16 G/DL (ref 12–16)
HGB BLD-MCNC: 16.3 G/DL (ref 12–16)
HGB BLD-MCNC: 16.4 G/DL (ref 12–16)
HGB BLD-MCNC: 16.7 G/DL (ref 12–16)
HGB BLD-MCNC: 5.6 G/DL (ref 12–16)
HGB BLD-MCNC: 6.5 G/DL (ref 12–16)
HGB BLD-MCNC: 8.2 G/DL (ref 12–16)
HGB BLD-MCNC: 8.3 G/DL (ref 12–16)
HGB BLD-MCNC: 8.5 G/DL (ref 12–16)
HGB BLD-MCNC: 9.4 G/DL (ref 12–16)
HGB BLD-MCNC: ABNORMAL G/DL (ref 12–16)
HGB BLD-MCNC: ABNORMAL G/DL (ref 12–16)
HOROWITZ INDEX BLDA+IHG-RTO: 41 MM[HG]
HOROWITZ INDEX BLDA+IHG-RTO: 43 MM[HG]
HOROWITZ INDEX BLDA+IHG-RTO: 47 MM[HG]
HOROWITZ INDEX BLDA+IHG-RTO: 47 MM[HG]
HOROWITZ INDEX BLDA+IHG-RTO: 49 MM[HG]
HOROWITZ INDEX BLDA+IHG-RTO: 49 MM[HG]
HOROWITZ INDEX BLDA+IHG-RTO: 50 MM[HG]
HOROWITZ INDEX BLDA+IHG-RTO: 53 MM[HG]
HOROWITZ INDEX BLDA+IHG-RTO: 65 MM[HG]
HOROWITZ INDEX BLDA+IHG-RTO: 67 MM[HG]
IMM GRANULOCYTES # BLD AUTO: 0.01 K/UL (ref 0–0.11)
IMM GRANULOCYTES # BLD AUTO: 0.07 K/UL (ref 0–0.11)
IMM GRANULOCYTES # BLD AUTO: 0.1 K/UL (ref 0–0.11)
IMM GRANULOCYTES # BLD AUTO: 0.17 K/UL (ref 0–0.11)
IMM GRANULOCYTES NFR BLD AUTO: 0.3 % (ref 0–0.9)
IMM GRANULOCYTES NFR BLD AUTO: 0.8 % (ref 0–0.9)
IMM GRANULOCYTES NFR BLD AUTO: 0.9 % (ref 0–0.9)
IMM GRANULOCYTES NFR BLD AUTO: 1 % (ref 0–0.9)
INR PPP: 3.21 (ref 0.87–1.13)
INR PPP: 7.27 (ref 0.87–1.13)
INR PPP: 8.19 (ref 0.87–1.13)
INR PPP: >=10 (ref 0.87–1.13)
LACTATE BLD-SCNC: 13.4 MMOL/L (ref 0.5–2)
LACTATE BLD-SCNC: 14.9 MMOL/L (ref 0.5–2)
LACTATE BLD-SCNC: 19.5 MMOL/L (ref 0.5–2)
LACTATE BLD-SCNC: 20 MMOL/L (ref 0.5–2)
LACTATE BLD-SCNC: >22 MMOL/L (ref 0.5–2)
LACTATE BLD-SCNC: >22 MMOL/L (ref 0.5–2)
LV EJECT FRACT  99904: 55
LV EJECT FRACT  99904: 75
LV EJECT FRACT MOD 2C 99903: 54.89
LV EJECT FRACT MOD 4C 99902: 60.3
LV EJECT FRACT MOD BP 99901: 56.9
LYMPHOCYTES # BLD AUTO: 0.22 K/UL (ref 1–4.8)
LYMPHOCYTES # BLD AUTO: 0.42 K/UL (ref 1–4.8)
LYMPHOCYTES # BLD AUTO: 0.59 K/UL (ref 1–4.8)
LYMPHOCYTES # BLD AUTO: 0.72 K/UL (ref 1–4.8)
LYMPHOCYTES # BLD AUTO: 0.87 K/UL (ref 1–4.8)
LYMPHOCYTES # BLD AUTO: 0.99 K/UL (ref 1–4.8)
LYMPHOCYTES # BLD AUTO: 3.56 K/UL (ref 1–4.8)
LYMPHOCYTES # BLD AUTO: 5.83 K/UL (ref 1–4.8)
LYMPHOCYTES NFR BLD: 11.4 % (ref 22–41)
LYMPHOCYTES NFR BLD: 13.8 % (ref 22–41)
LYMPHOCYTES NFR BLD: 2.6 % (ref 22–41)
LYMPHOCYTES NFR BLD: 24.3 % (ref 22–41)
LYMPHOCYTES NFR BLD: 3.3 % (ref 22–41)
LYMPHOCYTES NFR BLD: 38 % (ref 22–41)
LYMPHOCYTES NFR BLD: 4.4 % (ref 22–41)
LYMPHOCYTES NFR BLD: 7.9 % (ref 22–41)
MAGNESIUM SERPL-MCNC: 1.7 MG/DL (ref 1.5–2.5)
MAGNESIUM SERPL-MCNC: 2.3 MG/DL (ref 1.5–2.5)
MAGNESIUM SERPL-MCNC: 2.4 MG/DL (ref 1.5–2.5)
MAGNESIUM SERPL-MCNC: 2.8 MG/DL (ref 1.5–2.5)
MANUAL DIFF BLD: NORMAL
MCF BLD TEG: 40.5 MM (ref 50–70)
MCF BLD TEG: 51.1 MM (ref 50–70)
MCF P HPASE BLD TEG: 41.6 MM (ref 50–70)
MCH RBC QN AUTO: 29.3 PG (ref 27–33)
MCH RBC QN AUTO: 29.5 PG (ref 27–33)
MCH RBC QN AUTO: 29.7 PG (ref 27–33)
MCH RBC QN AUTO: 29.8 PG (ref 27–33)
MCH RBC QN AUTO: 29.9 PG (ref 27–33)
MCH RBC QN AUTO: 29.9 PG (ref 27–33)
MCH RBC QN AUTO: 30 PG (ref 27–33)
MCH RBC QN AUTO: 30.2 PG (ref 27–33)
MCH RBC QN AUTO: 30.6 PG (ref 27–33)
MCH RBC QN AUTO: 31 PG (ref 27–33)
MCH RBC QN AUTO: 31.3 PG (ref 27–33)
MCH RBC QN AUTO: 31.3 PG (ref 27–33)
MCH RBC QN AUTO: 31.9 PG (ref 27–33)
MCHC RBC AUTO-ENTMCNC: 30 G/DL (ref 33.6–35)
MCHC RBC AUTO-ENTMCNC: 30.3 G/DL (ref 33.6–35)
MCHC RBC AUTO-ENTMCNC: 30.6 G/DL (ref 33.6–35)
MCHC RBC AUTO-ENTMCNC: 30.9 G/DL (ref 33.6–35)
MCHC RBC AUTO-ENTMCNC: 31.8 G/DL (ref 33.6–35)
MCHC RBC AUTO-ENTMCNC: 33.1 G/DL (ref 33.6–35)
MCHC RBC AUTO-ENTMCNC: 33.4 G/DL (ref 33.6–35)
MCHC RBC AUTO-ENTMCNC: 33.6 G/DL (ref 33.6–35)
MCHC RBC AUTO-ENTMCNC: 33.8 G/DL (ref 33.6–35)
MCHC RBC AUTO-ENTMCNC: 34.1 G/DL (ref 33.6–35)
MCHC RBC AUTO-ENTMCNC: 34.2 G/DL (ref 33.6–35)
MCHC RBC AUTO-ENTMCNC: 34.2 G/DL (ref 33.6–35)
MCHC RBC AUTO-ENTMCNC: 34.9 G/DL (ref 33.6–35)
MCV RBC AUTO: 100.3 FL (ref 81.4–97.8)
MCV RBC AUTO: 102.2 FL (ref 81.4–97.8)
MCV RBC AUTO: 102.3 FL (ref 81.4–97.8)
MCV RBC AUTO: 104.3 FL (ref 81.4–97.8)
MCV RBC AUTO: 85.8 FL (ref 81.4–97.8)
MCV RBC AUTO: 87.3 FL (ref 81.4–97.8)
MCV RBC AUTO: 87.6 FL (ref 81.4–97.8)
MCV RBC AUTO: 87.7 FL (ref 81.4–97.8)
MCV RBC AUTO: 87.9 FL (ref 81.4–97.8)
MCV RBC AUTO: 88.2 FL (ref 81.4–97.8)
MCV RBC AUTO: 88.2 FL (ref 81.4–97.8)
MCV RBC AUTO: 89.6 FL (ref 81.4–97.8)
MCV RBC AUTO: 99.3 FL (ref 81.4–97.8)
METAMYELOCYTES NFR BLD MANUAL: 0.9 %
METAMYELOCYTES NFR BLD MANUAL: 0.9 %
MODE IMODE: ABNORMAL
MONOCYTES # BLD AUTO: 0 K/UL (ref 0–0.85)
MONOCYTES # BLD AUTO: 0.1 K/UL (ref 0–0.85)
MONOCYTES # BLD AUTO: 0.16 K/UL (ref 0–0.85)
MONOCYTES # BLD AUTO: 0.21 K/UL (ref 0–0.85)
MONOCYTES # BLD AUTO: 0.25 K/UL (ref 0–0.85)
MONOCYTES # BLD AUTO: 0.26 K/UL (ref 0–0.85)
MONOCYTES # BLD AUTO: 0.3 K/UL (ref 0–0.85)
MONOCYTES # BLD AUTO: 0.46 K/UL (ref 0–0.85)
MONOCYTES NFR BLD AUTO: 0 % (ref 0–13.4)
MONOCYTES NFR BLD AUTO: 0.9 % (ref 0–13.4)
MONOCYTES NFR BLD AUTO: 1.3 % (ref 0–13.4)
MONOCYTES NFR BLD AUTO: 1.5 % (ref 0–13.4)
MONOCYTES NFR BLD AUTO: 2 % (ref 0–13.4)
MONOCYTES NFR BLD AUTO: 3.5 % (ref 0–13.4)
MONOCYTES NFR BLD AUTO: 5.9 % (ref 0–13.4)
MONOCYTES NFR BLD AUTO: 6.2 % (ref 0–13.4)
MORPHOLOGY BLD-IMP: NORMAL
MYELOCYTES NFR BLD MANUAL: 1.7 %
NEUTROPHILS # BLD AUTO: 1.45 K/UL (ref 2–7.15)
NEUTROPHILS # BLD AUTO: 12.09 K/UL (ref 2–7.15)
NEUTROPHILS # BLD AUTO: 15.22 K/UL (ref 2–7.15)
NEUTROPHILS # BLD AUTO: 2.48 K/UL (ref 2–7.15)
NEUTROPHILS # BLD AUTO: 21.8 K/UL (ref 2–7.15)
NEUTROPHILS # BLD AUTO: 43.49 K/UL (ref 2–7.15)
NEUTROPHILS # BLD AUTO: 6.68 K/UL (ref 2–7.15)
NEUTROPHILS # BLD AUTO: 8.08 K/UL (ref 2–7.15)
NEUTROPHILS NFR BLD: 54.9 % (ref 44–72)
NEUTROPHILS NFR BLD: 69.2 % (ref 44–72)
NEUTROPHILS NFR BLD: 81.9 % (ref 44–72)
NEUTROPHILS NFR BLD: 85.1 % (ref 44–72)
NEUTROPHILS NFR BLD: 89.5 % (ref 44–72)
NEUTROPHILS NFR BLD: 92.6 % (ref 44–72)
NEUTROPHILS NFR BLD: 93 % (ref 44–72)
NEUTROPHILS NFR BLD: 93.5 % (ref 44–72)
NEUTS BAND NFR BLD MANUAL: 0.9 % (ref 0–10)
NEUTS BAND NFR BLD MANUAL: 0.9 % (ref 0–10)
NEUTS BAND NFR BLD MANUAL: 2.6 % (ref 0–10)
NRBC # BLD AUTO: 0 K/UL
NRBC # BLD AUTO: 0.04 K/UL
NRBC # BLD AUTO: 0.13 K/UL
NRBC BLD-RTO: 0 /100 WBC
NRBC BLD-RTO: 0.1 /100 WBC
NRBC BLD-RTO: 0.5 /100 WBC
O2/TOTAL GAS SETTING VFR VENT: 100 %
PA AA BLD-ACNC: 65.4 %
PA AA BLD-ACNC: 98.1 %
PA ADP BLD-ACNC: 100 %
PA ADP BLD-ACNC: 93.8 %
PATH REV: NORMAL
PATH REV: NORMAL
PCO2 BLDA: 40 MMHG (ref 26–37)
PCO2 BLDA: 44.7 MMHG (ref 26–37)
PCO2 BLDA: 46.8 MMHG (ref 26–37)
PCO2 BLDA: 47 MMHG (ref 26–37)
PCO2 BLDA: 49.5 MMHG (ref 26–37)
PCO2 BLDA: 49.8 MMHG (ref 26–37)
PCO2 BLDA: 54.2 MMHG (ref 26–37)
PCO2 BLDA: 65.6 MMHG (ref 26–37)
PCO2 BLDA: 85.6 MMHG (ref 26–37)
PCO2 BLDA: 86.4 MMHG (ref 26–37)
PCO2 TEMP ADJ BLDA: 39.4 MMHG (ref 26–37)
PCO2 TEMP ADJ BLDA: 44.7 MMHG (ref 26–37)
PCO2 TEMP ADJ BLDA: 44.8 MMHG (ref 26–37)
PCO2 TEMP ADJ BLDA: 47 MMHG (ref 26–37)
PCO2 TEMP ADJ BLDA: 48.7 MMHG (ref 26–37)
PCO2 TEMP ADJ BLDA: 48.9 MMHG (ref 26–37)
PCO2 TEMP ADJ BLDA: 51.2 MMHG (ref 26–37)
PCO2 TEMP ADJ BLDA: 63.4 MMHG (ref 26–37)
PCO2 TEMP ADJ BLDA: 83 MMHG (ref 26–37)
PCO2 TEMP ADJ BLDA: 84.7 MMHG (ref 26–37)
PEEP END EXPIRATORY PRESSURE IPEEP: 10 CMH20
PEEP END EXPIRATORY PRESSURE IPEEP: 10 CMH20
PEEP END EXPIRATORY PRESSURE IPEEP: 16 CMH20
PEEP END EXPIRATORY PRESSURE IPEEP: 8 CMH20
PH BLDA: 6.94 [PH] (ref 7.4–7.5)
PH BLDA: 6.96 [PH] (ref 7.4–7.5)
PH BLDA: 6.97 [PH] (ref 7.4–7.5)
PH BLDA: 6.97 [PH] (ref 7.4–7.5)
PH BLDA: 7.01 [PH] (ref 7.4–7.5)
PH BLDA: 7.02 [PH] (ref 7.4–7.5)
PH BLDA: 7.05 [PH] (ref 7.4–7.5)
PH BLDA: 7.06 [PH] (ref 7.4–7.5)
PH BLDA: 7.09 [PH] (ref 7.4–7.5)
PH BLDA: 7.1 [PH] (ref 7.4–7.5)
PH TEMP ADJ BLDA: 6.95 [PH] (ref 7.4–7.5)
PH TEMP ADJ BLDA: 6.96 [PH] (ref 7.4–7.5)
PH TEMP ADJ BLDA: 6.97 [PH] (ref 7.4–7.5)
PH TEMP ADJ BLDA: 6.98 [PH] (ref 7.4–7.5)
PH TEMP ADJ BLDA: 7.01 [PH] (ref 7.4–7.5)
PH TEMP ADJ BLDA: 7.02 [PH] (ref 7.4–7.5)
PH TEMP ADJ BLDA: 7.05 [PH] (ref 7.4–7.5)
PH TEMP ADJ BLDA: 7.06 [PH] (ref 7.4–7.5)
PH TEMP ADJ BLDA: 7.09 [PH] (ref 7.4–7.5)
PH TEMP ADJ BLDA: 7.11 [PH] (ref 7.4–7.5)
PHOSPHATE SERPL-MCNC: 15.1 MG/DL (ref 2.5–4.5)
PHOSPHATE SERPL-MCNC: 2.3 MG/DL (ref 2.5–4.5)
PHOSPHATE SERPL-MCNC: 3.8 MG/DL (ref 2.5–4.5)
PLATELET # BLD AUTO: 104 K/UL (ref 164–446)
PLATELET # BLD AUTO: 112 K/UL (ref 164–446)
PLATELET # BLD AUTO: 117 K/UL (ref 164–446)
PLATELET # BLD AUTO: 136 K/UL (ref 164–446)
PLATELET # BLD AUTO: 149 K/UL (ref 164–446)
PLATELET # BLD AUTO: 182 K/UL (ref 164–446)
PLATELET # BLD AUTO: 182 K/UL (ref 164–446)
PLATELET # BLD AUTO: 204 K/UL (ref 164–446)
PLATELET # BLD AUTO: 41 K/UL (ref 164–446)
PLATELET # BLD AUTO: 44 K/UL (ref 164–446)
PLATELET # BLD AUTO: 71 K/UL (ref 164–446)
PLATELET # BLD AUTO: 81 K/UL (ref 164–446)
PLATELET # BLD AUTO: 88 K/UL (ref 164–446)
PLATELET BLD QL SMEAR: NORMAL
PMV BLD AUTO: 10.5 FL (ref 9–12.9)
PMV BLD AUTO: 10.6 FL (ref 9–12.9)
PMV BLD AUTO: 10.7 FL (ref 9–12.9)
PMV BLD AUTO: 10.8 FL (ref 9–12.9)
PMV BLD AUTO: 11.1 FL (ref 9–12.9)
PMV BLD AUTO: 11.2 FL (ref 9–12.9)
PMV BLD AUTO: 11.3 FL (ref 9–12.9)
PMV BLD AUTO: 11.3 FL (ref 9–12.9)
PMV BLD AUTO: 11.5 FL (ref 9–12.9)
PMV BLD AUTO: 11.6 FL (ref 9–12.9)
PMV BLD AUTO: 12 FL (ref 9–12.9)
PO2 BLDA: 41 MMHG (ref 64–87)
PO2 BLDA: 43 MMHG (ref 64–87)
PO2 BLDA: 47 MMHG (ref 64–87)
PO2 BLDA: 47 MMHG (ref 64–87)
PO2 BLDA: 49 MMHG (ref 64–87)
PO2 BLDA: 49 MMHG (ref 64–87)
PO2 BLDA: 50 MMHG (ref 64–87)
PO2 BLDA: 53 MMHG (ref 64–87)
PO2 BLDA: 65 MMHG (ref 64–87)
PO2 BLDA: 67 MMHG (ref 64–87)
PO2 TEMP ADJ BLDA: 41 MMHG (ref 64–87)
PO2 TEMP ADJ BLDA: 41 MMHG (ref 64–87)
PO2 TEMP ADJ BLDA: 46 MMHG (ref 64–87)
PO2 TEMP ADJ BLDA: 47 MMHG (ref 64–87)
PO2 TEMP ADJ BLDA: 49 MMHG (ref 64–87)
PO2 TEMP ADJ BLDA: 50 MMHG (ref 64–87)
PO2 TEMP ADJ BLDA: 59 MMHG (ref 64–87)
PO2 TEMP ADJ BLDA: 62 MMHG (ref 64–87)
POIKILOCYTOSIS BLD QL SMEAR: NORMAL
POIKILOCYTOSIS BLD QL SMEAR: NORMAL
POTASSIUM BLD-SCNC: 4.4 MMOL/L (ref 3.6–5.5)
POTASSIUM BLD-SCNC: 4.5 MMOL/L (ref 3.6–5.5)
POTASSIUM BLD-SCNC: 4.9 MMOL/L (ref 3.6–5.5)
POTASSIUM BLD-SCNC: 5.1 MMOL/L (ref 3.6–5.5)
POTASSIUM BLD-SCNC: 6 MMOL/L (ref 3.6–5.5)
POTASSIUM SERPL-SCNC: 1.8 MMOL/L (ref 3.6–5.5)
POTASSIUM SERPL-SCNC: 2.9 MMOL/L (ref 3.6–5.5)
POTASSIUM SERPL-SCNC: 3 MMOL/L (ref 3.6–5.5)
POTASSIUM SERPL-SCNC: 3.1 MMOL/L (ref 3.6–5.5)
POTASSIUM SERPL-SCNC: 3.4 MMOL/L (ref 3.6–5.5)
POTASSIUM SERPL-SCNC: 3.7 MMOL/L (ref 3.6–5.5)
POTASSIUM SERPL-SCNC: 3.8 MMOL/L (ref 3.6–5.5)
POTASSIUM SERPL-SCNC: 3.9 MMOL/L (ref 3.6–5.5)
POTASSIUM SERPL-SCNC: 4 MMOL/L (ref 3.6–5.5)
POTASSIUM SERPL-SCNC: 4 MMOL/L (ref 3.6–5.5)
POTASSIUM SERPL-SCNC: 4.5 MMOL/L (ref 3.6–5.5)
POTASSIUM SERPL-SCNC: 4.6 MMOL/L (ref 3.6–5.5)
POTASSIUM SERPL-SCNC: 4.7 MMOL/L (ref 3.6–5.5)
POTASSIUM SERPL-SCNC: 4.8 MMOL/L (ref 3.6–5.5)
POTASSIUM SERPL-SCNC: 5.4 MMOL/L (ref 3.6–5.5)
POTASSIUM SERPL-SCNC: 5.7 MMOL/L (ref 3.6–5.5)
POTASSIUM SERPL-SCNC: 5.9 MMOL/L (ref 3.6–5.5)
PROCALCITONIN SERPL-MCNC: 0.12 NG/ML
PROCALCITONIN SERPL-MCNC: 0.18 NG/ML
PROCALCITONIN SERPL-MCNC: 0.26 NG/ML
PROCALCITONIN SERPL-MCNC: 0.28 NG/ML
PROCALCITONIN SERPL-MCNC: 0.48 NG/ML
PROCALCITONIN SERPL-MCNC: 1.16 NG/ML
PRODUCT TYPE UPROD: NORMAL
PROT SERPL-MCNC: 2.7 G/DL (ref 6–8.2)
PROT SERPL-MCNC: 2.8 G/DL (ref 6–8.2)
PROT SERPL-MCNC: 3.6 G/DL (ref 6–8.2)
PROT SERPL-MCNC: 5.4 G/DL (ref 6–8.2)
PROT SERPL-MCNC: 6 G/DL (ref 6–8.2)
PROT SERPL-MCNC: 6.1 G/DL (ref 6–8.2)
PROT SERPL-MCNC: 6.3 G/DL (ref 6–8.2)
PROT SERPL-MCNC: 6.5 G/DL (ref 6–8.2)
PROTHROMBIN TIME: 31.9 SEC (ref 12–14.6)
PROTHROMBIN TIME: 60 SEC (ref 12–14.6)
PROTHROMBIN TIME: 65.8 SEC (ref 12–14.6)
PROTHROMBIN TIME: 89.5 SEC (ref 12–14.6)
RBC # BLD AUTO: 1.79 M/UL (ref 4.2–5.4)
RBC # BLD AUTO: 2.08 M/UL (ref 4.2–5.4)
RBC # BLD AUTO: 2.71 M/UL (ref 4.2–5.4)
RBC # BLD AUTO: 3.03 M/UL (ref 4.2–5.4)
RBC # BLD AUTO: 3.13 M/UL (ref 4.2–5.4)
RBC # BLD AUTO: 4.67 M/UL (ref 4.2–5.4)
RBC # BLD AUTO: 4.97 M/UL (ref 4.2–5.4)
RBC # BLD AUTO: 5.16 M/UL (ref 4.2–5.4)
RBC # BLD AUTO: 5.29 M/UL (ref 4.2–5.4)
RBC # BLD AUTO: 5.43 M/UL (ref 4.2–5.4)
RBC # BLD AUTO: 5.45 M/UL (ref 4.2–5.4)
RBC # BLD AUTO: 5.48 M/UL (ref 4.2–5.4)
RBC # BLD AUTO: 5.6 M/UL (ref 4.2–5.4)
RBC BLD AUTO: NORMAL
RBC BLD AUTO: PRESENT
RBC BLD AUTO: PRESENT
RH BLD: NORMAL
RSV RNA SPEC QL NAA+PROBE: NEGATIVE
SAO2 % BLDA: 56 % (ref 93–99)
SAO2 % BLDA: 56 % (ref 93–99)
SAO2 % BLDA: 60 % (ref 93–99)
SAO2 % BLDA: 60 % (ref 93–99)
SAO2 % BLDA: 64 % (ref 93–99)
SAO2 % BLDA: 65 % (ref 93–99)
SAO2 % BLDA: 65 % (ref 93–99)
SAO2 % BLDA: 73 % (ref 93–99)
SAO2 % BLDA: 75 % (ref 93–99)
SAO2 % BLDA: 79 % (ref 93–99)
SARS-COV-2 RNA RESP QL NAA+PROBE: DETECTED
SIGNIFICANT IND 70042: NORMAL
SIGNIFICANT IND 70042: NORMAL
SITE SITE: NORMAL
SITE SITE: NORMAL
SODIUM BLD-SCNC: 128 MMOL/L (ref 135–145)
SODIUM BLD-SCNC: 145 MMOL/L (ref 135–145)
SODIUM BLD-SCNC: 146 MMOL/L (ref 135–145)
SODIUM BLD-SCNC: 146 MMOL/L (ref 135–145)
SODIUM BLD-SCNC: 147 MMOL/L (ref 135–145)
SODIUM SERPL-SCNC: 129 MMOL/L (ref 135–145)
SODIUM SERPL-SCNC: 132 MMOL/L (ref 135–145)
SODIUM SERPL-SCNC: 134 MMOL/L (ref 135–145)
SODIUM SERPL-SCNC: 134 MMOL/L (ref 135–145)
SODIUM SERPL-SCNC: 135 MMOL/L (ref 135–145)
SODIUM SERPL-SCNC: 136 MMOL/L (ref 135–145)
SODIUM SERPL-SCNC: 138 MMOL/L (ref 135–145)
SODIUM SERPL-SCNC: 141 MMOL/L (ref 135–145)
SODIUM SERPL-SCNC: 147 MMOL/L (ref 135–145)
SODIUM SERPL-SCNC: 149 MMOL/L (ref 135–145)
SODIUM SERPL-SCNC: 149 MMOL/L (ref 135–145)
SODIUM SERPL-SCNC: 151 MMOL/L (ref 135–145)
SODIUM SERPL-SCNC: 153 MMOL/L (ref 135–145)
SOURCE SOURCE: NORMAL
SOURCE SOURCE: NORMAL
SPECIMEN DRAWN FROM PATIENT: ABNORMAL
SPECIMEN SOURCE: ABNORMAL
T4 FREE SERPL-MCNC: 1.25 NG/DL (ref 0.93–1.7)
TEG ALGORITHM TGALG: ABNORMAL
TEG ALGORITHM TGALG: ABNORMAL
TIDAL VOLUME IVT: 0 ML
TIDAL VOLUME IVT: 370 ML
TIDAL VOLUME IVT: 430 ML
TIDAL VOLUME IVT: 450 ML
TIDAL VOLUME IVT: 450 ML
TOXIC GRANULES BLD QL SMEAR: NORMAL
TRIGL SERPL-MCNC: 328 MG/DL (ref 0–149)
TROPONIN T SERPL-MCNC: 6 NG/L (ref 6–19)
TSH SERPL DL<=0.005 MIU/L-ACNC: 0.04 UIU/ML (ref 0.38–5.33)
UFH PPP CHRO-ACNC: 0.1 IU/ML
UFH PPP CHRO-ACNC: 0.16 IU/ML
UNIT STATUS USTAT: NORMAL
WBC # BLD AUTO: 12.9 K/UL (ref 4.8–10.8)
WBC # BLD AUTO: 16.4 K/UL (ref 4.8–10.8)
WBC # BLD AUTO: 2.6 K/UL (ref 4.8–10.8)
WBC # BLD AUTO: 23 K/UL (ref 4.8–10.8)
WBC # BLD AUTO: 25.8 K/UL (ref 4.8–10.8)
WBC # BLD AUTO: 28.5 K/UL (ref 4.8–10.8)
WBC # BLD AUTO: 3.6 K/UL (ref 4.8–10.8)
WBC # BLD AUTO: 4.3 K/UL (ref 4.8–10.8)
WBC # BLD AUTO: 44 K/UL (ref 4.8–10.8)
WBC # BLD AUTO: 51.1 K/UL (ref 4.8–10.8)
WBC # BLD AUTO: 7.5 K/UL (ref 4.8–10.8)
WBC # BLD AUTO: 8.6 K/UL (ref 4.8–10.8)
WBC # BLD AUTO: 8.7 K/UL (ref 4.8–10.8)

## 2021-01-01 PROCEDURE — A9270 NON-COVERED ITEM OR SERVICE: HCPCS | Performed by: INTERNAL MEDICINE

## 2021-01-01 PROCEDURE — 84295 ASSAY OF SERUM SODIUM: CPT | Mod: 91

## 2021-01-01 PROCEDURE — 700102 HCHG RX REV CODE 250 W/ 637 OVERRIDE(OP): Performed by: INTERNAL MEDICINE

## 2021-01-01 PROCEDURE — 99233 SBSQ HOSP IP/OBS HIGH 50: CPT | Performed by: INTERNAL MEDICINE

## 2021-01-01 PROCEDURE — 94760 N-INVAS EAR/PLS OXIMETRY 1: CPT

## 2021-01-01 PROCEDURE — 93308 TTE F-UP OR LMTD: CPT | Mod: 26 | Performed by: INTERNAL MEDICINE

## 2021-01-01 PROCEDURE — 36556 INSERT NON-TUNNEL CV CATH: CPT | Mod: RT | Performed by: INTERNAL MEDICINE

## 2021-01-01 PROCEDURE — 700111 HCHG RX REV CODE 636 W/ 250 OVERRIDE (IP)

## 2021-01-01 PROCEDURE — 84100 ASSAY OF PHOSPHORUS: CPT

## 2021-01-01 PROCEDURE — 86850 RBC ANTIBODY SCREEN: CPT

## 2021-01-01 PROCEDURE — B31T1ZZ FLUOROSCOPY OF LEFT PULMONARY ARTERY USING LOW OSMOLAR CONTRAST: ICD-10-PCS | Performed by: RADIOLOGY

## 2021-01-01 PROCEDURE — 700105 HCHG RX REV CODE 258: Performed by: INTERNAL MEDICINE

## 2021-01-01 PROCEDURE — 93325 DOPPLER ECHO COLOR FLOW MAPG: CPT

## 2021-01-01 PROCEDURE — 36556 INSERT NON-TUNNEL CV CATH: CPT

## 2021-01-01 PROCEDURE — 700111 HCHG RX REV CODE 636 W/ 250 OVERRIDE (IP): Performed by: INTERNAL MEDICINE

## 2021-01-01 PROCEDURE — 99285 EMERGENCY DEPT VISIT HI MDM: CPT

## 2021-01-01 PROCEDURE — A9270 NON-COVERED ITEM OR SERVICE: HCPCS | Performed by: HOSPITALIST

## 2021-01-01 PROCEDURE — 700105 HCHG RX REV CODE 258: Performed by: PSYCHIATRY & NEUROLOGY

## 2021-01-01 PROCEDURE — 700105 HCHG RX REV CODE 258: Performed by: RADIOLOGY

## 2021-01-01 PROCEDURE — 84132 ASSAY OF SERUM POTASSIUM: CPT

## 2021-01-01 PROCEDURE — 94002 VENT MGMT INPAT INIT DAY: CPT

## 2021-01-01 PROCEDURE — 80048 BASIC METABOLIC PNL TOTAL CA: CPT | Mod: 91

## 2021-01-01 PROCEDURE — 85007 BL SMEAR W/DIFF WBC COUNT: CPT | Mod: 91

## 2021-01-01 PROCEDURE — 84145 PROCALCITONIN (PCT): CPT

## 2021-01-01 PROCEDURE — 700102 HCHG RX REV CODE 250 W/ 637 OVERRIDE(OP): Performed by: HOSPITALIST

## 2021-01-01 PROCEDURE — 94003 VENT MGMT INPAT SUBQ DAY: CPT

## 2021-01-01 PROCEDURE — 85027 COMPLETE CBC AUTOMATED: CPT

## 2021-01-01 PROCEDURE — 99291 CRITICAL CARE FIRST HOUR: CPT | Performed by: INTERNAL MEDICINE

## 2021-01-01 PROCEDURE — 82803 BLOOD GASES ANY COMBINATION: CPT | Mod: 91

## 2021-01-01 PROCEDURE — 71045 X-RAY EXAM CHEST 1 VIEW: CPT

## 2021-01-01 PROCEDURE — 770006 HCHG ROOM/CARE - MED/SURG/GYN SEMI*

## 2021-01-01 PROCEDURE — 700111 HCHG RX REV CODE 636 W/ 250 OVERRIDE (IP): Performed by: HOSPITALIST

## 2021-01-01 PROCEDURE — 05HM33Z INSERTION OF INFUSION DEVICE INTO RIGHT INTERNAL JUGULAR VEIN, PERCUTANEOUS APPROACH: ICD-10-PCS | Performed by: INTERNAL MEDICINE

## 2021-01-01 PROCEDURE — 99292 CRITICAL CARE ADDL 30 MIN: CPT | Mod: 25 | Performed by: INTERNAL MEDICINE

## 2021-01-01 PROCEDURE — 700101 HCHG RX REV CODE 250: Performed by: PSYCHIATRY & NEUROLOGY

## 2021-01-01 PROCEDURE — 85384 FIBRINOGEN ACTIVITY: CPT

## 2021-01-01 PROCEDURE — 85610 PROTHROMBIN TIME: CPT | Mod: 91

## 2021-01-01 PROCEDURE — 85014 HEMATOCRIT: CPT | Mod: 91

## 2021-01-01 PROCEDURE — 94640 AIRWAY INHALATION TREATMENT: CPT

## 2021-01-01 PROCEDURE — 93321 DOPPLER ECHO F-UP/LMTD STD: CPT | Mod: 26 | Performed by: INTERNAL MEDICINE

## 2021-01-01 PROCEDURE — 83605 ASSAY OF LACTIC ACID: CPT

## 2021-01-01 PROCEDURE — 36014 PLACE CATHETER IN ARTERY: CPT

## 2021-01-01 PROCEDURE — 770022 HCHG ROOM/CARE - ICU (200)

## 2021-01-01 PROCEDURE — 700101 HCHG RX REV CODE 250: Performed by: INTERNAL MEDICINE

## 2021-01-01 PROCEDURE — 80053 COMPREHEN METABOLIC PANEL: CPT

## 2021-01-01 PROCEDURE — 86140 C-REACTIVE PROTEIN: CPT | Mod: 91

## 2021-01-01 PROCEDURE — 99223 1ST HOSP IP/OBS HIGH 75: CPT | Performed by: HOSPITALIST

## 2021-01-01 PROCEDURE — 37799 UNLISTED PX VASCULAR SURGERY: CPT

## 2021-01-01 PROCEDURE — 8E0ZXY6 ISOLATION: ICD-10-PCS | Performed by: HOSPITALIST

## 2021-01-01 PROCEDURE — 04HY32Z INSERTION OF MONITORING DEVICE INTO LOWER ARTERY, PERCUTANEOUS APPROACH: ICD-10-PCS | Performed by: INTERNAL MEDICINE

## 2021-01-01 PROCEDURE — 700111 HCHG RX REV CODE 636 W/ 250 OVERRIDE (IP): Performed by: EMERGENCY MEDICINE

## 2021-01-01 PROCEDURE — 71275 CT ANGIOGRAPHY CHEST: CPT

## 2021-01-01 PROCEDURE — XW033E5 INTRODUCTION OF REMDESIVIR ANTI-INFECTIVE INTO PERIPHERAL VEIN, PERCUTANEOUS APPROACH, NEW TECHNOLOGY GROUP 5: ICD-10-PCS | Performed by: PSYCHIATRY & NEUROLOGY

## 2021-01-01 PROCEDURE — 84295 ASSAY OF SERUM SODIUM: CPT

## 2021-01-01 PROCEDURE — 93970 EXTREMITY STUDY: CPT

## 2021-01-01 PROCEDURE — 5A09357 ASSISTANCE WITH RESPIRATORY VENTILATION, LESS THAN 24 CONSECUTIVE HOURS, CONTINUOUS POSITIVE AIRWAY PRESSURE: ICD-10-PCS | Performed by: INTERNAL MEDICINE

## 2021-01-01 PROCEDURE — 86140 C-REACTIVE PROTEIN: CPT

## 2021-01-01 PROCEDURE — 82962 GLUCOSE BLOOD TEST: CPT

## 2021-01-01 PROCEDURE — 700117 HCHG RX CONTRAST REV CODE 255: Performed by: EMERGENCY MEDICINE

## 2021-01-01 PROCEDURE — 85347 COAGULATION TIME ACTIVATED: CPT | Mod: 91

## 2021-01-01 PROCEDURE — 700101 HCHG RX REV CODE 250

## 2021-01-01 PROCEDURE — 92950 HEART/LUNG RESUSCITATION CPR: CPT

## 2021-01-01 PROCEDURE — 93325 DOPPLER ECHO COLOR FLOW MAPG: CPT | Mod: 26 | Performed by: INTERNAL MEDICINE

## 2021-01-01 PROCEDURE — 83735 ASSAY OF MAGNESIUM: CPT

## 2021-01-01 PROCEDURE — 85025 COMPLETE CBC W/AUTO DIFF WBC: CPT

## 2021-01-01 PROCEDURE — 84703 CHORIONIC GONADOTROPIN ASSAY: CPT

## 2021-01-01 PROCEDURE — 5A0935A ASSISTANCE WITH RESPIRATORY VENTILATION, LESS THAN 24 CONSECUTIVE HOURS, HIGH NASAL FLOW/VELOCITY: ICD-10-PCS | Performed by: HOSPITALIST

## 2021-01-01 PROCEDURE — 3E06317 INTRODUCTION OF OTHER THROMBOLYTIC INTO CENTRAL ARTERY, PERCUTANEOUS APPROACH: ICD-10-PCS | Performed by: RADIOLOGY

## 2021-01-01 PROCEDURE — 94799 UNLISTED PULMONARY SVC/PX: CPT

## 2021-01-01 PROCEDURE — C9803 HOPD COVID-19 SPEC COLLECT: HCPCS | Performed by: EMERGENCY MEDICINE

## 2021-01-01 PROCEDURE — 80500 HCHG CLINICAL PATH CONSULT-LIMITED: CPT

## 2021-01-01 PROCEDURE — 85007 BL SMEAR W/DIFF WBC COUNT: CPT

## 2021-01-01 PROCEDURE — 700111 HCHG RX REV CODE 636 W/ 250 OVERRIDE (IP): Performed by: STUDENT IN AN ORGANIZED HEALTH CARE EDUCATION/TRAINING PROGRAM

## 2021-01-01 PROCEDURE — P9016 RBC LEUKOCYTES REDUCED: HCPCS | Mod: 91

## 2021-01-01 PROCEDURE — 03HY32Z INSERTION OF MONITORING DEVICE INTO UPPER ARTERY, PERCUTANEOUS APPROACH: ICD-10-PCS | Performed by: INTERNAL MEDICINE

## 2021-01-01 PROCEDURE — B31S1ZZ FLUOROSCOPY OF RIGHT PULMONARY ARTERY USING LOW OSMOLAR CONTRAST: ICD-10-PCS | Performed by: RADIOLOGY

## 2021-01-01 PROCEDURE — 36620 INSERTION CATHETER ARTERY: CPT

## 2021-01-01 PROCEDURE — 84439 ASSAY OF FREE THYROXINE: CPT

## 2021-01-01 PROCEDURE — 0241U HCHG SARS-COV-2 COVID-19 NFCT DS RESP RNA 4 TRGT MIC: CPT

## 2021-01-01 PROCEDURE — 84443 ASSAY THYROID STIM HORMONE: CPT

## 2021-01-01 PROCEDURE — 36415 COLL VENOUS BLD VENIPUNCTURE: CPT

## 2021-01-01 PROCEDURE — 84484 ASSAY OF TROPONIN QUANT: CPT

## 2021-01-01 PROCEDURE — 85379 FIBRIN DEGRADATION QUANT: CPT

## 2021-01-01 PROCEDURE — 94660 CPAP INITIATION&MGMT: CPT

## 2021-01-01 PROCEDURE — 700105 HCHG RX REV CODE 258

## 2021-01-01 PROCEDURE — P9034 PLATELETS, PHERESIS: HCPCS

## 2021-01-01 PROCEDURE — 99291 CRITICAL CARE FIRST HOUR: CPT | Mod: 25 | Performed by: INTERNAL MEDICINE

## 2021-01-01 PROCEDURE — 96374 THER/PROPH/DIAG INJ IV PUSH: CPT

## 2021-01-01 PROCEDURE — 700111 HCHG RX REV CODE 636 W/ 250 OVERRIDE (IP): Mod: JW | Performed by: INTERNAL MEDICINE

## 2021-01-01 PROCEDURE — 30243R1 TRANSFUSION OF NONAUTOLOGOUS PLATELETS INTO CENTRAL VEIN, PERCUTANEOUS APPROACH: ICD-10-PCS | Performed by: INTERNAL MEDICINE

## 2021-01-01 PROCEDURE — 80074 ACUTE HEPATITIS PANEL: CPT

## 2021-01-01 PROCEDURE — 06HY33Z INSERTION OF INFUSION DEVICE INTO LOWER VEIN, PERCUTANEOUS APPROACH: ICD-10-PCS | Performed by: INTERNAL MEDICINE

## 2021-01-01 PROCEDURE — 82330 ASSAY OF CALCIUM: CPT

## 2021-01-01 PROCEDURE — P9017 PLASMA 1 DONOR FRZ W/IN 8 HR: HCPCS

## 2021-01-01 PROCEDURE — 85730 THROMBOPLASTIN TIME PARTIAL: CPT | Mod: 91

## 2021-01-01 PROCEDURE — 37211 THROMBOLYTIC ART THERAPY: CPT

## 2021-01-01 PROCEDURE — 02FQ3Z0 FRAGMENTATION OF RIGHT PULMONARY ARTERY, PERCUTANEOUS APPROACH, ULTRASONIC: ICD-10-PCS | Performed by: RADIOLOGY

## 2021-01-01 PROCEDURE — 80053 COMPREHEN METABOLIC PANEL: CPT | Mod: 91

## 2021-01-01 PROCEDURE — 02FR3Z0 FRAGMENTATION OF LEFT PULMONARY ARTERY, PERCUTANEOUS APPROACH, ULTRASONIC: ICD-10-PCS | Performed by: RADIOLOGY

## 2021-01-01 PROCEDURE — 5A12012 PERFORMANCE OF CARDIAC OUTPUT, SINGLE, MANUAL: ICD-10-PCS | Performed by: INTERNAL MEDICINE

## 2021-01-01 PROCEDURE — 87040 BLOOD CULTURE FOR BACTERIA: CPT

## 2021-01-01 PROCEDURE — 02HV33Z INSERTION OF INFUSION DEVICE INTO SUPERIOR VENA CAVA, PERCUTANEOUS APPROACH: ICD-10-PCS | Performed by: INTERNAL MEDICINE

## 2021-01-01 PROCEDURE — 86900 BLOOD TYPING SEROLOGIC ABO: CPT

## 2021-01-01 PROCEDURE — 80048 BASIC METABOLIC PNL TOTAL CA: CPT

## 2021-01-01 PROCEDURE — 36430 TRANSFUSION BLD/BLD COMPNT: CPT

## 2021-01-01 PROCEDURE — 30243M1 TRANSFUSION OF NONAUTOLOGOUS PLASMA CRYOPRECIPITATE INTO CENTRAL VEIN, PERCUTANEOUS APPROACH: ICD-10-PCS | Performed by: INTERNAL MEDICINE

## 2021-01-01 PROCEDURE — 31500 INSERT EMERGENCY AIRWAY: CPT | Performed by: INTERNAL MEDICINE

## 2021-01-01 PROCEDURE — 3E03317 INTRODUCTION OF OTHER THROMBOLYTIC INTO PERIPHERAL VEIN, PERCUTANEOUS APPROACH: ICD-10-PCS | Performed by: INTERNAL MEDICINE

## 2021-01-01 PROCEDURE — 30243L1 TRANSFUSION OF NONAUTOLOGOUS FRESH PLASMA INTO CENTRAL VEIN, PERCUTANEOUS APPROACH: ICD-10-PCS | Performed by: INTERNAL MEDICINE

## 2021-01-01 PROCEDURE — 85027 COMPLETE CBC AUTOMATED: CPT | Mod: 91

## 2021-01-01 PROCEDURE — 86901 BLOOD TYPING SEROLOGIC RH(D): CPT

## 2021-01-01 PROCEDURE — 5A1945Z RESPIRATORY VENTILATION, 24-96 CONSECUTIVE HOURS: ICD-10-PCS | Performed by: INTERNAL MEDICINE

## 2021-01-01 PROCEDURE — 85520 HEPARIN ASSAY: CPT | Mod: 91

## 2021-01-01 PROCEDURE — 5A0945A ASSISTANCE WITH RESPIRATORY VENTILATION, 24-96 CONSECUTIVE HOURS, HIGH NASAL FLOW/VELOCITY: ICD-10-PCS | Performed by: HOSPITALIST

## 2021-01-01 PROCEDURE — 31500 INSERT EMERGENCY AIRWAY: CPT

## 2021-01-01 PROCEDURE — 36620 INSERTION CATHETER ARTERY: CPT | Performed by: INTERNAL MEDICINE

## 2021-01-01 PROCEDURE — 84478 ASSAY OF TRIGLYCERIDES: CPT

## 2021-01-01 PROCEDURE — P9012 CRYOPRECIPITATE EACH UNIT: HCPCS | Mod: 91

## 2021-01-01 PROCEDURE — 85576 BLOOD PLATELET AGGREGATION: CPT | Mod: 91

## 2021-01-01 PROCEDURE — 700111 HCHG RX REV CODE 636 W/ 250 OVERRIDE (IP): Performed by: PSYCHIATRY & NEUROLOGY

## 2021-01-01 PROCEDURE — 0BH17EZ INSERTION OF ENDOTRACHEAL AIRWAY INTO TRACHEA, VIA NATURAL OR ARTIFICIAL OPENING: ICD-10-PCS | Performed by: INTERNAL MEDICINE

## 2021-01-01 PROCEDURE — 83605 ASSAY OF LACTIC ACID: CPT | Mod: 91

## 2021-01-01 PROCEDURE — 30243N1 TRANSFUSION OF NONAUTOLOGOUS RED BLOOD CELLS INTO CENTRAL VEIN, PERCUTANEOUS APPROACH: ICD-10-PCS | Performed by: INTERNAL MEDICINE

## 2021-01-01 PROCEDURE — 86923 COMPATIBILITY TEST ELECTRIC: CPT | Mod: 91

## 2021-01-01 PROCEDURE — 85014 HEMATOCRIT: CPT

## 2021-01-01 PROCEDURE — 700111 HCHG RX REV CODE 636 W/ 250 OVERRIDE (IP): Performed by: RADIOLOGY

## 2021-01-01 PROCEDURE — 302132 K THERMIA MOTOR: Performed by: INTERNAL MEDICINE

## 2021-01-01 PROCEDURE — 700117 HCHG RX CONTRAST REV CODE 255: Performed by: RADIOLOGY

## 2021-01-01 PROCEDURE — 92950 HEART/LUNG RESUSCITATION CPR: CPT | Performed by: INTERNAL MEDICINE

## 2021-01-01 RX ORDER — CALCIUM CHLORIDE 100 MG/ML
INJECTION INTRAVENOUS; INTRAVENTRICULAR
Status: ACTIVE
Start: 2021-01-01 | End: 2021-01-01

## 2021-01-01 RX ORDER — DEXTROSE MONOHYDRATE 25 G/50ML
25-50 INJECTION, SOLUTION INTRAVENOUS PRN
Status: DISCONTINUED | OUTPATIENT
Start: 2021-01-01 | End: 2021-06-26 | Stop reason: HOSPADM

## 2021-01-01 RX ORDER — NOREPINEPHRINE BITARTRATE 0.03 MG/ML
0-30 INJECTION, SOLUTION INTRAVENOUS CONTINUOUS
Status: DISCONTINUED | OUTPATIENT
Start: 2021-01-01 | End: 2021-01-01

## 2021-01-01 RX ORDER — FUROSEMIDE 10 MG/ML
40 INJECTION INTRAMUSCULAR; INTRAVENOUS
Status: DISCONTINUED | OUTPATIENT
Start: 2021-01-01 | End: 2021-01-01

## 2021-01-01 RX ORDER — SODIUM CHLORIDE, SODIUM LACTATE, POTASSIUM CHLORIDE, AND CALCIUM CHLORIDE .6; .31; .03; .02 G/100ML; G/100ML; G/100ML; G/100ML
1000 INJECTION, SOLUTION INTRAVENOUS ONCE
Status: COMPLETED | OUTPATIENT
Start: 2021-01-01 | End: 2021-01-01

## 2021-01-01 RX ORDER — HEPARIN SODIUM 5000 [USP'U]/100ML
500 INJECTION, SOLUTION INTRAVENOUS CONTINUOUS
Status: ACTIVE | OUTPATIENT
Start: 2021-01-01 | End: 2021-01-01

## 2021-01-01 RX ORDER — HEPARIN SODIUM 1000 [USP'U]/ML
4000 INJECTION, SOLUTION INTRAVENOUS; SUBCUTANEOUS ONCE
Status: COMPLETED | OUTPATIENT
Start: 2021-01-01 | End: 2021-01-01

## 2021-01-01 RX ORDER — HEPARIN SODIUM 5000 [USP'U]/100ML
INJECTION, SOLUTION INTRAVENOUS
Status: COMPLETED
Start: 2021-01-01 | End: 2021-01-01

## 2021-01-01 RX ORDER — VECURONIUM BROMIDE 1 MG/ML
7.5 INJECTION, POWDER, LYOPHILIZED, FOR SOLUTION INTRAVENOUS ONCE
Status: ACTIVE | OUTPATIENT
Start: 2021-01-01 | End: 2021-01-01

## 2021-01-01 RX ORDER — EPINEPHRINE IN SOD CHLOR,ISO 1 MG/10 ML
1 SYRINGE (ML) INTRAVENOUS ONCE
Status: COMPLETED | OUTPATIENT
Start: 2021-01-01 | End: 2021-01-01

## 2021-01-01 RX ORDER — POTASSIUM CHLORIDE 20 MEQ/1
40 TABLET, EXTENDED RELEASE ORAL ONCE
Status: COMPLETED | OUTPATIENT
Start: 2021-01-01 | End: 2021-01-01

## 2021-01-01 RX ORDER — TRAZODONE HYDROCHLORIDE 50 MG/1
50 TABLET ORAL
Status: DISCONTINUED | OUTPATIENT
Start: 2021-01-01 | End: 2021-01-01

## 2021-01-01 RX ORDER — LEVOTHYROXINE SODIUM 0.12 MG/1
125 TABLET ORAL
Status: DISCONTINUED | OUTPATIENT
Start: 2021-01-01 | End: 2021-01-01

## 2021-01-01 RX ORDER — CALCIUM CHLORIDE 100 MG/ML
1 INJECTION INTRAVENOUS; INTRAVENTRICULAR ONCE
Status: DISCONTINUED | OUTPATIENT
Start: 2021-01-01 | End: 2021-01-01

## 2021-01-01 RX ORDER — MORPHINE SULFATE 4 MG/ML
1 INJECTION, SOLUTION INTRAMUSCULAR; INTRAVENOUS ONCE
Status: COMPLETED | OUTPATIENT
Start: 2021-01-01 | End: 2021-01-01

## 2021-01-01 RX ORDER — ONDANSETRON 4 MG/1
4 TABLET, ORALLY DISINTEGRATING ORAL EVERY 6 HOURS PRN
Status: DISCONTINUED | OUTPATIENT
Start: 2021-01-01 | End: 2021-01-01

## 2021-01-01 RX ORDER — BENZONATATE 100 MG/1
100 CAPSULE ORAL 3 TIMES DAILY PRN
Status: DISCONTINUED | OUTPATIENT
Start: 2021-01-01 | End: 2021-01-01

## 2021-01-01 RX ORDER — HEPARIN SODIUM 1000 [USP'U]/ML
40 INJECTION, SOLUTION INTRAVENOUS; SUBCUTANEOUS PRN
Status: DISCONTINUED | OUTPATIENT
Start: 2021-01-01 | End: 2021-01-01

## 2021-01-01 RX ORDER — LEVOTHYROXINE SODIUM 137 UG/1
137 TABLET ORAL
Status: DISCONTINUED | OUTPATIENT
Start: 2021-01-01 | End: 2021-01-01

## 2021-01-01 RX ORDER — IBUPROFEN 800 MG/1
400 TABLET ORAL EVERY 6 HOURS PRN
Status: DISCONTINUED | OUTPATIENT
Start: 2021-01-01 | End: 2021-01-01

## 2021-01-01 RX ORDER — HEPARIN SODIUM 5000 [USP'U]/100ML
0-30 INJECTION, SOLUTION INTRAVENOUS CONTINUOUS
Status: DISCONTINUED | OUTPATIENT
Start: 2021-01-01 | End: 2021-01-01

## 2021-01-01 RX ORDER — FLUTICASONE PROPIONATE 50 MCG
2 SPRAY, SUSPENSION (ML) NASAL DAILY
Status: DISCONTINUED | OUTPATIENT
Start: 2021-01-01 | End: 2021-01-01

## 2021-01-01 RX ORDER — MAGNESIUM SULFATE HEPTAHYDRATE 40 MG/ML
2 INJECTION, SOLUTION INTRAVENOUS ONCE
Status: COMPLETED | OUTPATIENT
Start: 2021-01-01 | End: 2021-01-01

## 2021-01-01 RX ORDER — HEPARIN SODIUM 1000 [USP'U]/ML
2000 INJECTION, SOLUTION INTRAVENOUS; SUBCUTANEOUS PRN
Status: DISCONTINUED | OUTPATIENT
Start: 2021-01-01 | End: 2021-01-01

## 2021-01-01 RX ORDER — AMOXICILLIN 250 MG
2 CAPSULE ORAL 2 TIMES DAILY
Status: DISCONTINUED | OUTPATIENT
Start: 2021-01-01 | End: 2021-06-26 | Stop reason: HOSPADM

## 2021-01-01 RX ORDER — FAMOTIDINE 20 MG/1
20 TABLET, FILM COATED ORAL EVERY 12 HOURS
Status: DISCONTINUED | OUTPATIENT
Start: 2021-01-01 | End: 2021-01-01

## 2021-01-01 RX ORDER — GUAIFENESIN/DEXTROMETHORPHAN 100-10MG/5
5 SYRUP ORAL EVERY 6 HOURS PRN
Status: DISCONTINUED | OUTPATIENT
Start: 2021-01-01 | End: 2021-01-01

## 2021-01-01 RX ORDER — DEXAMETHASONE SODIUM PHOSPHATE 4 MG/ML
6 INJECTION, SOLUTION INTRA-ARTICULAR; INTRALESIONAL; INTRAMUSCULAR; INTRAVENOUS; SOFT TISSUE ONCE
Status: COMPLETED | OUTPATIENT
Start: 2021-01-01 | End: 2021-01-01

## 2021-01-01 RX ORDER — SODIUM CHLORIDE 9 MG/ML
INJECTION, SOLUTION INTRAVENOUS CONTINUOUS
Status: ACTIVE | OUTPATIENT
Start: 2021-01-01 | End: 2021-01-01

## 2021-01-01 RX ORDER — MAGNESIUM SULFATE HEPTAHYDRATE 40 MG/ML
4 INJECTION, SOLUTION INTRAVENOUS ONCE
Status: COMPLETED | OUTPATIENT
Start: 2021-01-01 | End: 2021-01-01

## 2021-01-01 RX ORDER — CALCIUM CHLORIDE 100 MG/ML
INJECTION INTRAVENOUS; INTRAVENTRICULAR
Status: COMPLETED
Start: 2021-01-01 | End: 2021-01-01

## 2021-01-01 RX ORDER — VITAMIN B COMPLEX
1000 TABLET ORAL EVERY MORNING
Status: DISCONTINUED | OUTPATIENT
Start: 2021-01-01 | End: 2021-06-26 | Stop reason: HOSPADM

## 2021-01-01 RX ORDER — IBUPROFEN 200 MG
400 TABLET ORAL EVERY 6 HOURS PRN
COMMUNITY

## 2021-01-01 RX ORDER — ALPRAZOLAM 0.25 MG/1
0.25 TABLET ORAL 3 TIMES DAILY PRN
Status: DISCONTINUED | OUTPATIENT
Start: 2021-01-01 | End: 2021-01-01

## 2021-01-01 RX ORDER — ROCURONIUM BROMIDE 10 MG/ML
INJECTION, SOLUTION INTRAVENOUS
Status: COMPLETED
Start: 2021-01-01 | End: 2021-01-01

## 2021-01-01 RX ORDER — DEXAMETHASONE 6 MG/1
6 TABLET ORAL DAILY
Status: DISCONTINUED | OUTPATIENT
Start: 2021-01-01 | End: 2021-01-01

## 2021-01-01 RX ORDER — ETOMIDATE 2 MG/ML
20 INJECTION INTRAVENOUS ONCE
Status: COMPLETED | OUTPATIENT
Start: 2021-01-01 | End: 2021-01-01

## 2021-01-01 RX ORDER — CHOLECALCIFEROL (VITAMIN D3) 125 MCG
250 CAPSULE ORAL DAILY
Status: DISCONTINUED | OUTPATIENT
Start: 2021-01-01 | End: 2021-01-01

## 2021-01-01 RX ORDER — CALCIUM CHLORIDE 100 MG/ML
INJECTION INTRAVENOUS; INTRAVENTRICULAR
Status: DISCONTINUED
Start: 2021-01-01 | End: 2021-01-01 | Stop reason: HOSPADM

## 2021-01-01 RX ORDER — CALCIUM CHLORIDE 100 MG/ML
1 INJECTION INTRAVENOUS; INTRAVENTRICULAR ONCE
Status: COMPLETED | OUTPATIENT
Start: 2021-01-01 | End: 2021-01-01

## 2021-01-01 RX ORDER — SODIUM BICARBONATE IN D5W 150/1000ML
PLASTIC BAG, INJECTION (ML) INTRAVENOUS CONTINUOUS
Status: DISCONTINUED | OUTPATIENT
Start: 2021-01-01 | End: 2021-06-26 | Stop reason: HOSPADM

## 2021-01-01 RX ORDER — AZITHROMYCIN 250 MG/1
500 TABLET, FILM COATED ORAL DAILY
Status: DISCONTINUED | OUTPATIENT
Start: 2021-01-01 | End: 2021-01-01

## 2021-01-01 RX ORDER — POTASSIUM CHLORIDE 20 MEQ/1
40 TABLET, EXTENDED RELEASE ORAL EVERY 6 HOURS
Status: COMPLETED | OUTPATIENT
Start: 2021-01-01 | End: 2021-01-01

## 2021-01-01 RX ORDER — FAMOTIDINE 20 MG/1
20 TABLET, FILM COATED ORAL DAILY
Status: DISCONTINUED | OUTPATIENT
Start: 2021-06-26 | End: 2021-06-26 | Stop reason: HOSPADM

## 2021-01-01 RX ORDER — PHENYLEPHRINE HCL IN 0.9% NACL 0.5 MG/5ML
100 SYRINGE (ML) INTRAVENOUS
Status: ACTIVE | OUTPATIENT
Start: 2021-01-01 | End: 2021-01-01

## 2021-01-01 RX ORDER — SODIUM CHLORIDE 9 MG/ML
INJECTION, SOLUTION INTRAVENOUS ONCE
Status: ACTIVE | OUTPATIENT
Start: 2021-01-01 | End: 2021-01-01

## 2021-01-01 RX ORDER — SODIUM CHLORIDE 9 MG/ML
500 INJECTION, SOLUTION INTRAVENOUS
Status: ACTIVE | OUTPATIENT
Start: 2021-01-01 | End: 2021-01-01

## 2021-01-01 RX ORDER — EPINEPHRINE HCL IN 0.9 % NACL 4MG/250ML
0-20 PLASTIC BAG, INJECTION (ML) INTRAVENOUS CONTINUOUS
Status: DISCONTINUED | OUTPATIENT
Start: 2021-01-01 | End: 2021-01-01

## 2021-01-01 RX ORDER — ALUMINUM HYDROXIDE AND MAGNESIUM CARBONATE 160; 105 MG/1; MG/1
1 TABLET, CHEWABLE ORAL 3 TIMES DAILY PRN
COMMUNITY

## 2021-01-01 RX ORDER — ACETAMINOPHEN 325 MG/1
650 TABLET ORAL EVERY 6 HOURS PRN
Status: DISCONTINUED | OUTPATIENT
Start: 2021-01-01 | End: 2021-01-01

## 2021-01-01 RX ORDER — GUAIFENESIN 600 MG/1
600 TABLET, EXTENDED RELEASE ORAL EVERY 12 HOURS
Status: DISCONTINUED | OUTPATIENT
Start: 2021-01-01 | End: 2021-01-01

## 2021-01-01 RX ORDER — LINEZOLID 2 MG/ML
600 INJECTION, SOLUTION INTRAVENOUS EVERY 12 HOURS
Status: DISCONTINUED | OUTPATIENT
Start: 2021-01-01 | End: 2021-06-26 | Stop reason: HOSPADM

## 2021-01-01 RX ORDER — ASCORBIC ACID 500 MG
500 TABLET ORAL DAILY
Status: DISCONTINUED | OUTPATIENT
Start: 2021-01-01 | End: 2021-01-01

## 2021-01-01 RX ORDER — CHOLECALCIFEROL (VITAMIN D3) 125 MCG
5 CAPSULE ORAL NIGHTLY
Status: DISCONTINUED | OUTPATIENT
Start: 2021-01-01 | End: 2021-01-01

## 2021-01-01 RX ORDER — BISACODYL 10 MG
10 SUPPOSITORY, RECTAL RECTAL
Status: DISCONTINUED | OUTPATIENT
Start: 2021-01-01 | End: 2021-06-26 | Stop reason: HOSPADM

## 2021-01-01 RX ORDER — POTASSIUM CHLORIDE 20 MEQ/1
60 TABLET, EXTENDED RELEASE ORAL ONCE
Status: COMPLETED | OUTPATIENT
Start: 2021-01-01 | End: 2021-01-01

## 2021-01-01 RX ORDER — ROCURONIUM BROMIDE 10 MG/ML
50 INJECTION, SOLUTION INTRAVENOUS ONCE
Status: COMPLETED | OUTPATIENT
Start: 2021-01-01 | End: 2021-01-01

## 2021-01-01 RX ORDER — LOPERAMIDE HYDROCHLORIDE 2 MG/1
2 CAPSULE ORAL 4 TIMES DAILY PRN
Status: DISCONTINUED | OUTPATIENT
Start: 2021-01-01 | End: 2021-01-01

## 2021-01-01 RX ORDER — MAGNESIUM SULFATE HEPTAHYDRATE 40 MG/ML
INJECTION, SOLUTION INTRAVENOUS
Status: DISCONTINUED
Start: 2021-01-01 | End: 2021-06-26 | Stop reason: HOSPADM

## 2021-01-01 RX ORDER — ONDANSETRON 2 MG/ML
4 INJECTION INTRAMUSCULAR; INTRAVENOUS EVERY 6 HOURS PRN
Status: DISCONTINUED | OUTPATIENT
Start: 2021-01-01 | End: 2021-06-26 | Stop reason: HOSPADM

## 2021-01-01 RX ORDER — VECURONIUM BROMIDE 1 MG/ML
7.5 INJECTION, POWDER, LYOPHILIZED, FOR SOLUTION INTRAVENOUS
Status: DISCONTINUED | OUTPATIENT
Start: 2021-01-01 | End: 2021-01-01

## 2021-01-01 RX ORDER — ZINC SULFATE 50(220)MG
220 CAPSULE ORAL DAILY
Status: DISCONTINUED | OUTPATIENT
Start: 2021-01-01 | End: 2021-01-01

## 2021-01-01 RX ORDER — ROCURONIUM BROMIDE 10 MG/ML
60 INJECTION, SOLUTION INTRAVENOUS ONCE
Status: COMPLETED | OUTPATIENT
Start: 2021-01-01 | End: 2021-01-01

## 2021-01-01 RX ORDER — FUROSEMIDE 10 MG/ML
40 INJECTION INTRAMUSCULAR; INTRAVENOUS ONCE
Status: COMPLETED | OUTPATIENT
Start: 2021-01-01 | End: 2021-01-01

## 2021-01-01 RX ORDER — POTASSIUM CHLORIDE 7.45 MG/ML
10 INJECTION INTRAVENOUS
Status: DISCONTINUED | OUTPATIENT
Start: 2021-01-01 | End: 2021-01-01

## 2021-01-01 RX ORDER — EPINEPHRINE HCL IN 0.9 % NACL 4MG/250ML
PLASTIC BAG, INJECTION (ML) INTRAVENOUS
Status: ACTIVE
Start: 2021-01-01 | End: 2021-01-01

## 2021-01-01 RX ORDER — ECHINACEA PURPUREA EXTRACT 125 MG
2 TABLET ORAL
Status: DISCONTINUED | OUTPATIENT
Start: 2021-01-01 | End: 2021-01-01

## 2021-01-01 RX ORDER — ACETAMINOPHEN 325 MG/1
650 TABLET ORAL EVERY 6 HOURS PRN
Status: DISCONTINUED | OUTPATIENT
Start: 2021-01-01 | End: 2021-06-26 | Stop reason: HOSPADM

## 2021-01-01 RX ORDER — LOPERAMIDE HYDROCHLORIDE 2 MG/1
2 CAPSULE ORAL ONCE
Status: COMPLETED | OUTPATIENT
Start: 2021-01-01 | End: 2021-01-01

## 2021-01-01 RX ORDER — POLYETHYLENE GLYCOL 3350 17 G/17G
1 POWDER, FOR SOLUTION ORAL
Status: DISCONTINUED | OUTPATIENT
Start: 2021-01-01 | End: 2021-06-26 | Stop reason: HOSPADM

## 2021-01-01 RX ADMIN — FUROSEMIDE 40 MG: 10 INJECTION, SOLUTION INTRAMUSCULAR; INTRAVENOUS at 05:27

## 2021-01-01 RX ADMIN — VASOPRESSIN 0.04 UNITS/MIN: 20 INJECTION INTRAVENOUS at 05:17

## 2021-01-01 RX ADMIN — ACETAMINOPHEN 650 MG: 325 TABLET, FILM COATED ORAL at 02:17

## 2021-01-01 RX ADMIN — EPINEPHRINE 1 MG: 0.1 INJECTION, SOLUTION ENDOTRACHEAL; INTRACARDIAC; INTRAVENOUS at 20:42

## 2021-01-01 RX ADMIN — SODIUM BICARBONATE 100 MEQ: 84 INJECTION, SOLUTION INTRAVENOUS at 07:30

## 2021-01-01 RX ADMIN — HYDROCORTISONE SODIUM SUCCINATE 100 MG: 100 INJECTION, POWDER, FOR SOLUTION INTRAMUSCULAR; INTRAVENOUS at 03:23

## 2021-01-01 RX ADMIN — DEXAMETHASONE 6 MG: 6 TABLET ORAL at 05:28

## 2021-01-01 RX ADMIN — Medication 1000 UNITS: at 05:27

## 2021-01-01 RX ADMIN — GUAIFENESIN AND DEXTROMETHORPHAN 5 ML: 100; 10 SYRUP ORAL at 11:12

## 2021-01-01 RX ADMIN — LOPERAMIDE HYDROCHLORIDE 2 MG: 2 CAPSULE ORAL at 14:02

## 2021-01-01 RX ADMIN — FUROSEMIDE 40 MG: 10 INJECTION, SOLUTION INTRAMUSCULAR; INTRAVENOUS at 05:43

## 2021-01-01 RX ADMIN — ENOXAPARIN SODIUM 40 MG: 40 INJECTION SUBCUTANEOUS at 04:59

## 2021-01-01 RX ADMIN — GUAIFENESIN AND DEXTROMETHORPHAN 5 ML: 100; 10 SYRUP ORAL at 20:37

## 2021-01-01 RX ADMIN — GUAIFENESIN 600 MG: 600 TABLET, EXTENDED RELEASE ORAL at 04:59

## 2021-01-01 RX ADMIN — PROPOFOL 80 MCG/KG/MIN: 10 INJECTION, EMULSION INTRAVENOUS at 23:26

## 2021-01-01 RX ADMIN — SODIUM CHLORIDE 3 G: 900 INJECTION INTRAVENOUS at 05:41

## 2021-01-01 RX ADMIN — ACETAMINOPHEN 650 MG: 325 TABLET, FILM COATED ORAL at 23:04

## 2021-01-01 RX ADMIN — SODIUM BICARBONATE 100 MEQ: 84 INJECTION, SOLUTION INTRAVENOUS at 08:48

## 2021-01-01 RX ADMIN — SODIUM CHLORIDE: 9 INJECTION, SOLUTION INTRAVENOUS at 01:23

## 2021-01-01 RX ADMIN — LEVOTHYROXINE SODIUM 125 MCG: 0.12 TABLET ORAL at 05:23

## 2021-01-01 RX ADMIN — DEXAMETHASONE 6 MG: 6 TABLET ORAL at 06:10

## 2021-01-01 RX ADMIN — BENZONATATE 100 MG: 100 CAPSULE ORAL at 13:04

## 2021-01-01 RX ADMIN — DOBUTAMINE HYDROCHLORIDE 5 MCG/KG/MIN: 100 INJECTION INTRAVENOUS at 11:35

## 2021-01-01 RX ADMIN — LEVOTHYROXINE SODIUM 125 MCG: 0.12 TABLET ORAL at 06:10

## 2021-01-01 RX ADMIN — NOREPINEPHRINE BITARTRATE 100 MCG/MIN: 1 INJECTION, SOLUTION, CONCENTRATE INTRAVENOUS at 08:16

## 2021-01-01 RX ADMIN — CALCIUM CHLORIDE 1 G: 100 INJECTION INTRAVENOUS; INTRAVENTRICULAR at 05:15

## 2021-01-01 RX ADMIN — ENOXAPARIN SODIUM 40 MG: 40 INJECTION SUBCUTANEOUS at 05:50

## 2021-01-01 RX ADMIN — FUROSEMIDE 40 MG: 10 INJECTION, SOLUTION INTRAMUSCULAR; INTRAVENOUS at 05:44

## 2021-01-01 RX ADMIN — Medication 1000 UNITS: at 06:10

## 2021-01-01 RX ADMIN — VASOPRESSIN 0.04 UNITS/MIN: 20 INJECTION INTRAVENOUS at 13:00

## 2021-01-01 RX ADMIN — GUAIFENESIN AND DEXTROMETHORPHAN 5 ML: 100; 10 SYRUP ORAL at 17:40

## 2021-01-01 RX ADMIN — GUAIFENESIN AND DEXTROMETHORPHAN 5 ML: 100; 10 SYRUP ORAL at 00:35

## 2021-01-01 RX ADMIN — CYANOCOBALAMIN TAB 500 MCG 250 MCG: 500 TAB at 17:47

## 2021-01-01 RX ADMIN — SODIUM BICARBONATE: 84 INJECTION, SOLUTION INTRAVENOUS at 22:15

## 2021-01-01 RX ADMIN — ACETAMINOPHEN 650 MG: 325 TABLET, FILM COATED ORAL at 03:02

## 2021-01-01 RX ADMIN — GUAIFENESIN AND DEXTROMETHORPHAN 5 ML: 100; 10 SYRUP ORAL at 05:27

## 2021-01-01 RX ADMIN — FLUTICASONE PROPIONATE 100 MCG: 50 SPRAY, METERED NASAL at 13:28

## 2021-01-01 RX ADMIN — ENOXAPARIN SODIUM 40 MG: 40 INJECTION SUBCUTANEOUS at 06:09

## 2021-01-01 RX ADMIN — SALINE NASAL SPRAY 2 SPRAY: 1.5 SOLUTION NASAL at 17:41

## 2021-01-01 RX ADMIN — BENZONATATE 100 MG: 100 CAPSULE ORAL at 14:06

## 2021-01-01 RX ADMIN — BENZOCAINE AND MENTHOL, UNSPECIFIED FORM 1 LOZENGE: 15; 3.6 LOZENGE ORAL at 17:39

## 2021-01-01 RX ADMIN — SODIUM BICARBONATE 100 MEQ: 84 INJECTION, SOLUTION INTRAVENOUS at 03:30

## 2021-01-01 RX ADMIN — CYANOCOBALAMIN TAB 500 MCG 250 MCG: 500 TAB at 05:00

## 2021-01-01 RX ADMIN — DIBASIC SODIUM PHOSPHATE, MONOBASIC POTASSIUM PHOSPHATE AND MONOBASIC SODIUM PHOSPHATE 500 MG: 852; 155; 130 TABLET ORAL at 16:19

## 2021-01-01 RX ADMIN — PROPOFOL 15 MCG/KG/MIN: 10 INJECTION, EMULSION INTRAVENOUS at 18:50

## 2021-01-01 RX ADMIN — ROCURONIUM BROMIDE 50 MG: 10 INJECTION, SOLUTION INTRAVENOUS at 23:26

## 2021-01-01 RX ADMIN — CALCIUM CHLORIDE 1 G: 100 INJECTION INTRAVENOUS; INTRAVENTRICULAR at 07:30

## 2021-01-01 RX ADMIN — LEVOTHYROXINE SODIUM 137 MCG: 0.14 TABLET ORAL at 17:48

## 2021-01-01 RX ADMIN — OXYCODONE HYDROCHLORIDE AND ACETAMINOPHEN 500 MG: 500 TABLET ORAL at 05:50

## 2021-01-01 RX ADMIN — SODIUM CHLORIDE: 9 INJECTION, SOLUTION INTRAVENOUS at 01:21

## 2021-01-01 RX ADMIN — EPINEPHRINE 40 MCG/MIN: 1 INJECTION INTRAMUSCULAR; INTRAVENOUS; SUBCUTANEOUS at 08:14

## 2021-01-01 RX ADMIN — MORPHINE SULFATE 1 MG: 4 INJECTION INTRAVENOUS at 03:50

## 2021-01-01 RX ADMIN — ALTEPLASE 40 MG: KIT at 21:15

## 2021-01-01 RX ADMIN — LEVOTHYROXINE SODIUM 125 MCG: 0.12 TABLET ORAL at 05:43

## 2021-01-01 RX ADMIN — Medication 100 MEQ: at 03:30

## 2021-01-01 RX ADMIN — Medication 5 MG: at 21:30

## 2021-01-01 RX ADMIN — DIBASIC SODIUM PHOSPHATE, MONOBASIC POTASSIUM PHOSPHATE AND MONOBASIC SODIUM PHOSPHATE 500 MG: 852; 155; 130 TABLET ORAL at 20:25

## 2021-01-01 RX ADMIN — SODIUM BICARBONATE 100 MEQ: 84 INJECTION INTRAVENOUS at 02:11

## 2021-01-01 RX ADMIN — ACETAMINOPHEN 650 MG: 325 TABLET, FILM COATED ORAL at 10:20

## 2021-01-01 RX ADMIN — ENOXAPARIN SODIUM 40 MG: 40 INJECTION SUBCUTANEOUS at 17:48

## 2021-01-01 RX ADMIN — MINERAL OIL, PETROLATUM 1 APPLICATION: 425; 573 OINTMENT OPHTHALMIC at 06:00

## 2021-01-01 RX ADMIN — EPINEPHRINE 1 MCG/MIN: 1 INJECTION INTRAMUSCULAR; INTRAVENOUS; SUBCUTANEOUS at 21:15

## 2021-01-01 RX ADMIN — OXYCODONE HYDROCHLORIDE AND ACETAMINOPHEN 500 MG: 500 TABLET ORAL at 13:04

## 2021-01-01 RX ADMIN — FLUTICASONE PROPIONATE 100 MCG: 50 SPRAY, METERED NASAL at 06:10

## 2021-01-01 RX ADMIN — Medication 1000 UNITS: at 05:50

## 2021-01-01 RX ADMIN — EPINEPHRINE 30 MCG/MIN: 1 INJECTION INTRAMUSCULAR; INTRAVENOUS; SUBCUTANEOUS at 02:05

## 2021-01-01 RX ADMIN — ACETAMINOPHEN 650 MG: 325 TABLET, FILM COATED ORAL at 17:24

## 2021-01-01 RX ADMIN — DEXAMETHASONE 6 MG: 6 TABLET ORAL at 05:43

## 2021-01-01 RX ADMIN — REMDESIVIR 100 MG: 100 INJECTION, POWDER, LYOPHILIZED, FOR SOLUTION INTRAVENOUS at 17:40

## 2021-01-01 RX ADMIN — ETOMIDATE 20 MG: 2 INJECTION INTRAVENOUS at 18:57

## 2021-01-01 RX ADMIN — ACETAMINOPHEN 650 MG: 325 TABLET, FILM COATED ORAL at 20:42

## 2021-01-01 RX ADMIN — WATER 0.05 MCG/KG/MIN: 1 SOLUTION INTRAVENOUS at 05:40

## 2021-01-01 RX ADMIN — ACETAMINOPHEN 650 MG: 325 TABLET, FILM COATED ORAL at 11:12

## 2021-01-01 RX ADMIN — ENOXAPARIN SODIUM 40 MG: 40 INJECTION SUBCUTANEOUS at 05:43

## 2021-01-01 RX ADMIN — ACETAMINOPHEN 650 MG: 325 TABLET, FILM COATED ORAL at 17:40

## 2021-01-01 RX ADMIN — PIPERACILLIN AND TAZOBACTAM 4.5 G: 4; .5 INJECTION, POWDER, LYOPHILIZED, FOR SOLUTION INTRAVENOUS; PARENTERAL at 14:39

## 2021-01-01 RX ADMIN — MINERAL OIL, PETROLATUM 1 APPLICATION: 425; 573 OINTMENT OPHTHALMIC at 13:45

## 2021-01-01 RX ADMIN — LEVOTHYROXINE SODIUM 125 MCG: 0.12 TABLET ORAL at 05:45

## 2021-01-01 RX ADMIN — LEVOTHYROXINE SODIUM 125 MCG: 0.12 TABLET ORAL at 17:48

## 2021-01-01 RX ADMIN — FUROSEMIDE 40 MG: 10 INJECTION, SOLUTION INTRAMUSCULAR; INTRAVENOUS at 16:26

## 2021-01-01 RX ADMIN — DEXAMETHASONE SODIUM PHOSPHATE 6 MG: 4 INJECTION, SOLUTION INTRA-ARTICULAR; INTRALESIONAL; INTRAMUSCULAR; INTRAVENOUS; SOFT TISSUE at 13:32

## 2021-01-01 RX ADMIN — LOPERAMIDE HYDROCHLORIDE 2 MG: 2 CAPSULE ORAL at 05:23

## 2021-01-01 RX ADMIN — GUAIFENESIN AND DEXTROMETHORPHAN 5 ML: 100; 10 SYRUP ORAL at 16:05

## 2021-01-01 RX ADMIN — SODIUM CHLORIDE 3 G: 900 INJECTION INTRAVENOUS at 23:46

## 2021-01-01 RX ADMIN — VASOPRESSIN 0.03 UNITS/MIN: 20 INJECTION INTRAVENOUS at 20:23

## 2021-01-01 RX ADMIN — ONDANSETRON 4 MG: 2 INJECTION INTRAMUSCULAR; INTRAVENOUS at 15:26

## 2021-01-01 RX ADMIN — LEVOTHYROXINE SODIUM 137 MCG: 0.14 TABLET ORAL at 05:27

## 2021-01-01 RX ADMIN — SODIUM BICARBONATE: 84 INJECTION, SOLUTION INTRAVENOUS at 13:49

## 2021-01-01 RX ADMIN — ALPRAZOLAM 0.25 MG: 0.25 TABLET ORAL at 12:58

## 2021-01-01 RX ADMIN — SODIUM CHLORIDE, POTASSIUM CHLORIDE, SODIUM LACTATE AND CALCIUM CHLORIDE 1000 ML: 600; 310; 30; 20 INJECTION, SOLUTION INTRAVENOUS at 21:02

## 2021-01-01 RX ADMIN — CYANOCOBALAMIN TAB 500 MCG 250 MCG: 500 TAB at 05:49

## 2021-01-01 RX ADMIN — IOHEXOL 24 ML: 300 INJECTION, SOLUTION INTRAVENOUS at 02:00

## 2021-01-01 RX ADMIN — HEPARIN SODIUM 12 UNITS/KG/HR: 5000 INJECTION, SOLUTION INTRAVENOUS at 23:07

## 2021-01-01 RX ADMIN — PIPERACILLIN AND TAZOBACTAM 4.5 G: 4; .5 INJECTION, POWDER, LYOPHILIZED, FOR SOLUTION INTRAVENOUS; PARENTERAL at 08:14

## 2021-01-01 RX ADMIN — LOPERAMIDE HYDROCHLORIDE 2 MG: 2 CAPSULE ORAL at 21:50

## 2021-01-01 RX ADMIN — GUAIFENESIN AND DEXTROMETHORPHAN 5 ML: 100; 10 SYRUP ORAL at 05:43

## 2021-01-01 RX ADMIN — ACETAMINOPHEN 650 MG: 325 TABLET, FILM COATED ORAL at 23:22

## 2021-01-01 RX ADMIN — DOBUTAMINE HYDROCHLORIDE 5 MCG/KG/MIN: 100 INJECTION INTRAVENOUS at 02:50

## 2021-01-01 RX ADMIN — LINEZOLID 600 MG: 600 INJECTION, SOLUTION INTRAVENOUS at 05:14

## 2021-01-01 RX ADMIN — EPINEPHRINE 40 MCG/MIN: 1 INJECTION INTRAMUSCULAR; INTRAVENOUS; SUBCUTANEOUS at 13:44

## 2021-01-01 RX ADMIN — GUAIFENESIN AND DEXTROMETHORPHAN 5 ML: 100; 10 SYRUP ORAL at 14:36

## 2021-01-01 RX ADMIN — ACETAMINOPHEN 650 MG: 325 TABLET, FILM COATED ORAL at 12:29

## 2021-01-01 RX ADMIN — LOPERAMIDE HYDROCHLORIDE 2 MG: 2 CAPSULE ORAL at 03:02

## 2021-01-01 RX ADMIN — SODIUM CHLORIDE 3 UNITS/HR: 9 INJECTION, SOLUTION INTRAVENOUS at 05:15

## 2021-01-01 RX ADMIN — AZITHROMYCIN MONOHYDRATE 500 MG: 250 TABLET ORAL at 10:20

## 2021-01-01 RX ADMIN — GUAIFENESIN AND DEXTROMETHORPHAN 5 ML: 100; 10 SYRUP ORAL at 12:29

## 2021-01-01 RX ADMIN — FUROSEMIDE 40 MG: 10 INJECTION, SOLUTION INTRAMUSCULAR; INTRAVENOUS at 17:42

## 2021-01-01 RX ADMIN — GUAIFENESIN 600 MG: 600 TABLET, EXTENDED RELEASE ORAL at 17:40

## 2021-01-01 RX ADMIN — ACETAMINOPHEN 650 MG: 325 TABLET, FILM COATED ORAL at 15:30

## 2021-01-01 RX ADMIN — BENZONATATE 100 MG: 100 CAPSULE ORAL at 00:35

## 2021-01-01 RX ADMIN — CALCIUM CHLORIDE 1000 MG: 100 INJECTION, SOLUTION INTRAVENOUS at 19:12

## 2021-01-01 RX ADMIN — Medication 1000 UNITS: at 05:45

## 2021-01-01 RX ADMIN — DIBASIC SODIUM PHOSPHATE, MONOBASIC POTASSIUM PHOSPHATE AND MONOBASIC SODIUM PHOSPHATE 500 MG: 852; 155; 130 TABLET ORAL at 12:58

## 2021-01-01 RX ADMIN — SODIUM CHLORIDE 3 G: 900 INJECTION INTRAVENOUS at 17:42

## 2021-01-01 RX ADMIN — ENOXAPARIN SODIUM 40 MG: 40 INJECTION SUBCUTANEOUS at 05:45

## 2021-01-01 RX ADMIN — LEVOTHYROXINE SODIUM 137 MCG: 0.14 TABLET ORAL at 04:59

## 2021-01-01 RX ADMIN — ENOXAPARIN SODIUM 40 MG: 40 INJECTION SUBCUTANEOUS at 05:28

## 2021-01-01 RX ADMIN — ALPRAZOLAM 0.25 MG: 0.25 TABLET ORAL at 17:37

## 2021-01-01 RX ADMIN — SODIUM CHLORIDE 7 UNITS/HR: 9 INJECTION, SOLUTION INTRAVENOUS at 15:54

## 2021-01-01 RX ADMIN — HYDROCORTISONE SODIUM SUCCINATE 100 MG: 100 INJECTION, POWDER, FOR SOLUTION INTRAMUSCULAR; INTRAVENOUS at 08:57

## 2021-01-01 RX ADMIN — PIPERACILLIN AND TAZOBACTAM 4.5 G: 4; .5 INJECTION, POWDER, LYOPHILIZED, FOR SOLUTION INTRAVENOUS; PARENTERAL at 03:23

## 2021-01-01 RX ADMIN — ENOXAPARIN SODIUM 40 MG: 40 INJECTION SUBCUTANEOUS at 04:28

## 2021-01-01 RX ADMIN — Medication 5 MG: at 20:36

## 2021-01-01 RX ADMIN — LEVOTHYROXINE SODIUM 125 MCG: 0.12 TABLET ORAL at 04:27

## 2021-01-01 RX ADMIN — FLUTICASONE PROPIONATE 100 MCG: 50 SPRAY, METERED NASAL at 19:46

## 2021-01-01 RX ADMIN — FUROSEMIDE 40 MG: 10 INJECTION, SOLUTION INTRAMUSCULAR; INTRAVENOUS at 10:24

## 2021-01-01 RX ADMIN — ROCURONIUM BROMIDE 90 MG: 10 INJECTION, SOLUTION INTRAVENOUS at 18:57

## 2021-01-01 RX ADMIN — FUROSEMIDE 40 MG: 10 INJECTION, SOLUTION INTRAMUSCULAR; INTRAVENOUS at 16:07

## 2021-01-01 RX ADMIN — ACETAMINOPHEN 650 MG: 325 TABLET, FILM COATED ORAL at 16:40

## 2021-01-01 RX ADMIN — TRANEXAMIC ACID 1000 MG: 100 INJECTION, SOLUTION INTRAVENOUS at 07:46

## 2021-01-01 RX ADMIN — LEVOTHYROXINE SODIUM 137 MCG: 0.14 TABLET ORAL at 05:45

## 2021-01-01 RX ADMIN — WATER 0.05 MCG/KG/MIN: 1 SOLUTION INTRAVENOUS at 23:43

## 2021-01-01 RX ADMIN — FUROSEMIDE 40 MG: 10 INJECTION, SOLUTION INTRAMUSCULAR; INTRAVENOUS at 05:22

## 2021-01-01 RX ADMIN — GUAIFENESIN AND DEXTROMETHORPHAN 5 ML: 100; 10 SYRUP ORAL at 10:41

## 2021-01-01 RX ADMIN — VASOPRESSIN 0.04 UNITS/MIN: 20 INJECTION INTRAVENOUS at 20:27

## 2021-01-01 RX ADMIN — BENZONATATE 100 MG: 100 CAPSULE ORAL at 21:54

## 2021-01-01 RX ADMIN — Medication 1000 UNITS: at 05:00

## 2021-01-01 RX ADMIN — LEVOTHYROXINE SODIUM 125 MCG: 0.12 TABLET ORAL at 05:00

## 2021-01-01 RX ADMIN — LEVOTHYROXINE SODIUM 137 MCG: 0.14 TABLET ORAL at 05:22

## 2021-01-01 RX ADMIN — FUROSEMIDE 40 MG: 10 INJECTION, SOLUTION INTRAMUSCULAR; INTRAVENOUS at 15:26

## 2021-01-01 RX ADMIN — GUAIFENESIN AND DEXTROMETHORPHAN 5 ML: 100; 10 SYRUP ORAL at 13:04

## 2021-01-01 RX ADMIN — SODIUM BICARBONATE 100 MEQ: 84 INJECTION, SOLUTION INTRAVENOUS at 02:37

## 2021-01-01 RX ADMIN — DEXAMETHASONE 6 MG: 6 TABLET ORAL at 16:40

## 2021-01-01 RX ADMIN — LEVOTHYROXINE SODIUM 137 MCG: 0.14 TABLET ORAL at 04:28

## 2021-01-01 RX ADMIN — PHENYLEPHRINE HYDROCHLORIDE 300 MCG/MIN: 10 INJECTION INTRAVENOUS at 08:14

## 2021-01-01 RX ADMIN — FUROSEMIDE 40 MG: 10 INJECTION, SOLUTION INTRAMUSCULAR; INTRAVENOUS at 05:40

## 2021-01-01 RX ADMIN — ALTEPLASE 1 MG/HR: KIT at 01:19

## 2021-01-01 RX ADMIN — MAGNESIUM SULFATE IN WATER 4 G: 40 INJECTION, SOLUTION INTRAVENOUS at 16:00

## 2021-01-01 RX ADMIN — POTASSIUM CHLORIDE 40 MEQ: 1500 TABLET, EXTENDED RELEASE ORAL at 16:26

## 2021-01-01 RX ADMIN — GUAIFENESIN AND DEXTROMETHORPHAN 5 ML: 100; 10 SYRUP ORAL at 02:55

## 2021-01-01 RX ADMIN — GUAIFENESIN AND DEXTROMETHORPHAN 5 ML: 100; 10 SYRUP ORAL at 20:41

## 2021-01-01 RX ADMIN — LINEZOLID 600 MG: 600 INJECTION, SOLUTION INTRAVENOUS at 17:32

## 2021-01-01 RX ADMIN — DOBUTAMINE HYDROCHLORIDE 5 MCG/KG/MIN: 100 INJECTION INTRAVENOUS at 13:43

## 2021-01-01 RX ADMIN — NOREPINEPHRINE BITARTRATE 8 MCG/MIN: 1 INJECTION, SOLUTION, CONCENTRATE INTRAVENOUS at 19:45

## 2021-01-01 RX ADMIN — Medication 5 MG: at 19:53

## 2021-01-01 RX ADMIN — ACETAMINOPHEN 650 MG: 325 TABLET, FILM COATED ORAL at 05:43

## 2021-01-01 RX ADMIN — Medication 5 MG: at 20:25

## 2021-01-01 RX ADMIN — GUAIFENESIN AND DEXTROMETHORPHAN 5 ML: 100; 10 SYRUP ORAL at 22:37

## 2021-01-01 RX ADMIN — DEXAMETHASONE 6 MG: 6 TABLET ORAL at 05:45

## 2021-01-01 RX ADMIN — LEVOTHYROXINE SODIUM 125 MCG: 0.12 TABLET ORAL at 05:50

## 2021-01-01 RX ADMIN — GUAIFENESIN AND DEXTROMETHORPHAN 5 ML: 100; 10 SYRUP ORAL at 08:35

## 2021-01-01 RX ADMIN — LEVOTHYROXINE SODIUM 137 MCG: 0.14 TABLET ORAL at 05:50

## 2021-01-01 RX ADMIN — DEXAMETHASONE 6 MG: 6 TABLET ORAL at 05:49

## 2021-01-01 RX ADMIN — LEVOTHYROXINE SODIUM 137 MCG: 0.14 TABLET ORAL at 05:43

## 2021-01-01 RX ADMIN — FUROSEMIDE 40 MG: 10 INJECTION, SOLUTION INTRAMUSCULAR; INTRAVENOUS at 15:30

## 2021-01-01 RX ADMIN — SODIUM BICARBONATE: 84 INJECTION, SOLUTION INTRAVENOUS at 19:50

## 2021-01-01 RX ADMIN — PROPOFOL 65 MCG/KG/MIN: 10 INJECTION, EMULSION INTRAVENOUS at 20:30

## 2021-01-01 RX ADMIN — FUROSEMIDE 40 MG: 10 INJECTION, SOLUTION INTRAMUSCULAR; INTRAVENOUS at 06:09

## 2021-01-01 RX ADMIN — BENZONATATE 100 MG: 100 CAPSULE ORAL at 16:20

## 2021-01-01 RX ADMIN — POTASSIUM CHLORIDE 10 MEQ: 7.46 INJECTION, SOLUTION INTRAVENOUS at 07:52

## 2021-01-01 RX ADMIN — Medication 1000 UNITS: at 05:43

## 2021-01-01 RX ADMIN — POTASSIUM CHLORIDE 60 MEQ: 1500 TABLET, EXTENDED RELEASE ORAL at 12:58

## 2021-01-01 RX ADMIN — FAMOTIDINE 20 MG: 10 INJECTION INTRAVENOUS at 05:14

## 2021-01-01 RX ADMIN — FUROSEMIDE 40 MG: 20 INJECTION, SOLUTION INTRAMUSCULAR; INTRAVENOUS at 21:10

## 2021-01-01 RX ADMIN — LOPERAMIDE HYDROCHLORIDE 2 MG: 2 CAPSULE ORAL at 20:40

## 2021-01-01 RX ADMIN — SODIUM CHLORIDE: 9 INJECTION, SOLUTION INTRAVENOUS at 01:24

## 2021-01-01 RX ADMIN — ACETAMINOPHEN 650 MG: 325 TABLET, FILM COATED ORAL at 14:36

## 2021-01-01 RX ADMIN — Medication 1000 UNITS: at 05:23

## 2021-01-01 RX ADMIN — POTASSIUM CHLORIDE 40 MEQ: 1500 TABLET, EXTENDED RELEASE ORAL at 10:40

## 2021-01-01 RX ADMIN — LEVOTHYROXINE SODIUM 137 MCG: 0.14 TABLET ORAL at 06:09

## 2021-01-01 RX ADMIN — PHENYLEPHRINE HYDROCHLORIDE 20 MCG/MIN: 10 INJECTION INTRAVENOUS at 23:36

## 2021-01-01 RX ADMIN — LEVOTHYROXINE SODIUM 125 MCG: 0.12 TABLET ORAL at 05:28

## 2021-01-01 RX ADMIN — REMDESIVIR 100 MG: 100 INJECTION, POWDER, LYOPHILIZED, FOR SOLUTION INTRAVENOUS at 19:53

## 2021-01-01 RX ADMIN — Medication 75 MCG: at 19:15

## 2021-01-01 RX ADMIN — HEPARIN SODIUM 500 UNITS/HR: 5000 INJECTION, SOLUTION INTRAVENOUS at 01:20

## 2021-01-01 RX ADMIN — ALTEPLASE 50 MG: KIT at 21:30

## 2021-01-01 RX ADMIN — SODIUM CHLORIDE 3 G: 900 INJECTION INTRAVENOUS at 11:55

## 2021-01-01 RX ADMIN — PHENYLEPHRINE HYDROCHLORIDE 300 MCG/MIN: 10 INJECTION INTRAVENOUS at 13:42

## 2021-01-01 RX ADMIN — DEXAMETHASONE 6 MG: 6 TABLET ORAL at 05:00

## 2021-01-01 RX ADMIN — FLUTICASONE PROPIONATE 100 MCG: 50 SPRAY, METERED NASAL at 05:45

## 2021-01-01 RX ADMIN — HYDROCORTISONE SODIUM SUCCINATE 100 MG: 100 INJECTION, POWDER, FOR SOLUTION INTRAMUSCULAR; INTRAVENOUS at 14:39

## 2021-01-01 RX ADMIN — ONDANSETRON 4 MG: 2 INJECTION INTRAMUSCULAR; INTRAVENOUS at 09:03

## 2021-01-01 RX ADMIN — REMDESIVIR 100 MG: 100 INJECTION, POWDER, LYOPHILIZED, FOR SOLUTION INTRAVENOUS at 17:55

## 2021-01-01 RX ADMIN — FUROSEMIDE 40 MG: 10 INJECTION, SOLUTION INTRAMUSCULAR; INTRAVENOUS at 17:31

## 2021-01-01 RX ADMIN — CALCIUM CHLORIDE 1000 MG: 100 INJECTION, SOLUTION INTRAVENOUS at 13:42

## 2021-01-01 RX ADMIN — DEXAMETHASONE 6 MG: 6 TABLET ORAL at 05:23

## 2021-01-01 RX ADMIN — BENZONATATE 100 MG: 100 CAPSULE ORAL at 19:25

## 2021-01-01 RX ADMIN — Medication 5 MG: at 20:41

## 2021-01-01 RX ADMIN — CYANOCOBALAMIN TAB 500 MCG 250 MCG: 500 TAB at 04:27

## 2021-01-01 RX ADMIN — FUROSEMIDE 40 MG: 10 INJECTION, SOLUTION INTRAMUSCULAR; INTRAVENOUS at 16:19

## 2021-01-01 RX ADMIN — NOREPINEPHRINE BITARTRATE 100 MCG/MIN: 1 INJECTION, SOLUTION, CONCENTRATE INTRAVENOUS at 02:06

## 2021-01-01 RX ADMIN — MAGNESIUM SULFATE 2 G: 2 INJECTION INTRAVENOUS at 10:16

## 2021-01-01 RX ADMIN — MINERAL OIL, PETROLATUM 1 APPLICATION: 425; 573 OINTMENT OPHTHALMIC at 22:00

## 2021-01-01 RX ADMIN — REMDESIVIR 100 MG: 100 INJECTION, POWDER, LYOPHILIZED, FOR SOLUTION INTRAVENOUS at 18:33

## 2021-01-01 RX ADMIN — PIPERACILLIN AND TAZOBACTAM 4.5 G: 4; .5 INJECTION, POWDER, LYOPHILIZED, FOR SOLUTION INTRAVENOUS; PARENTERAL at 20:27

## 2021-01-01 RX ADMIN — FENTANYL CITRATE 100 MCG: 50 INJECTION, SOLUTION INTRAMUSCULAR; INTRAVENOUS at 18:40

## 2021-01-01 RX ADMIN — Medication 1000 UNITS: at 17:48

## 2021-01-01 RX ADMIN — CALCIUM CHLORIDE 1 G: 100 INJECTION, SOLUTION INTRAVENOUS at 10:00

## 2021-01-01 RX ADMIN — SODIUM CHLORIDE 3 G: 900 INJECTION INTRAVENOUS at 10:24

## 2021-01-01 RX ADMIN — ENOXAPARIN SODIUM 40 MG: 40 INJECTION SUBCUTANEOUS at 05:23

## 2021-01-01 RX ADMIN — ACETAMINOPHEN 650 MG: 325 TABLET, FILM COATED ORAL at 08:35

## 2021-01-01 RX ADMIN — AZITHROMYCIN MONOHYDRATE 500 MG: 250 TABLET ORAL at 05:49

## 2021-01-01 RX ADMIN — GUAIFENESIN AND DEXTROMETHORPHAN 5 ML: 100; 10 SYRUP ORAL at 22:06

## 2021-01-01 RX ADMIN — Medication 1000 UNITS: at 04:28

## 2021-01-01 RX ADMIN — HEPARIN SODIUM 4000 UNITS: 1000 INJECTION, SOLUTION INTRAVENOUS; SUBCUTANEOUS at 23:06

## 2021-01-01 RX ADMIN — REMDESIVIR 200 MG: 100 INJECTION, POWDER, LYOPHILIZED, FOR SOLUTION INTRAVENOUS at 20:29

## 2021-01-01 RX ADMIN — IOHEXOL 70 ML: 350 INJECTION, SOLUTION INTRAVENOUS at 15:13

## 2021-01-01 RX ADMIN — POTASSIUM CHLORIDE 40 MEQ: 1500 TABLET, EXTENDED RELEASE ORAL at 11:12

## 2021-01-01 RX ADMIN — NOREPINEPHRINE BITARTRATE 100 MCG/MIN: 1 INJECTION, SOLUTION, CONCENTRATE INTRAVENOUS at 13:01

## 2021-01-01 RX ADMIN — POTASSIUM CHLORIDE 40 MEQ: 1500 TABLET, EXTENDED RELEASE ORAL at 09:03

## 2021-01-01 RX ADMIN — TRAZODONE HYDROCHLORIDE 50 MG: 50 TABLET ORAL at 22:30

## 2021-01-01 ASSESSMENT — ENCOUNTER SYMPTOMS
CHILLS: 1
COUGH: 0
FEVER: 0
COUGH: 1
COUGH: 1
MYALGIAS: 0
EYE DISCHARGE: 0
SINUS PAIN: 0
FEVER: 0
SHORTNESS OF BREATH: 1
ABDOMINAL PAIN: 0
DIZZINESS: 0
ABDOMINAL PAIN: 0
MYALGIAS: 0
FEVER: 1
NERVOUS/ANXIOUS: 0
FOCAL WEAKNESS: 0
ABDOMINAL PAIN: 0
FEVER: 1
SENSORY CHANGE: 0
VOMITING: 0
DEPRESSION: 0
FOCAL WEAKNESS: 0
NECK PAIN: 0
WEAKNESS: 0
STRIDOR: 0
BLURRED VISION: 0
CHILLS: 0
STRIDOR: 0
SENSORY CHANGE: 0
HEADACHES: 0
SPUTUM PRODUCTION: 0
FEVER: 1
WHEEZING: 0
VOMITING: 0
STRIDOR: 0
MYALGIAS: 0
HEMOPTYSIS: 0
BLURRED VISION: 0
ABDOMINAL PAIN: 0
SPUTUM PRODUCTION: 0
NAUSEA: 0
DIZZINESS: 0
HALLUCINATIONS: 0
MYALGIAS: 0
CHILLS: 0
NERVOUS/ANXIOUS: 1
ABDOMINAL PAIN: 0
CHILLS: 0
FOCAL WEAKNESS: 0
MYALGIAS: 0
PALPITATIONS: 0
DIZZINESS: 0
EYE DISCHARGE: 0
NERVOUS/ANXIOUS: 1
BLURRED VISION: 0
FEVER: 0
SPUTUM PRODUCTION: 0
ORTHOPNEA: 0
NAUSEA: 0
TREMORS: 0
FOCAL WEAKNESS: 0
NECK PAIN: 0
BLURRED VISION: 0
DIZZINESS: 0
ABDOMINAL PAIN: 0
CHILLS: 1
SENSORY CHANGE: 0
SENSORY CHANGE: 0
NAUSEA: 0
DIARRHEA: 0
ABDOMINAL PAIN: 1
DIZZINESS: 0
NAUSEA: 0
SHORTNESS OF BREATH: 1
CHILLS: 0
NECK PAIN: 0
VOMITING: 0
CONSTIPATION: 0
VOMITING: 0
COUGH: 1
CHILLS: 0
NAUSEA: 0
SPUTUM PRODUCTION: 0
SHORTNESS OF BREATH: 1
DIZZINESS: 0
MYALGIAS: 0
SHORTNESS OF BREATH: 1
VOMITING: 0
BLURRED VISION: 0
NAUSEA: 1
SEIZURES: 0
ABDOMINAL PAIN: 0
EYE PAIN: 0
CHILLS: 0
SPUTUM PRODUCTION: 0
BLOOD IN STOOL: 0
DIZZINESS: 0
STRIDOR: 0
FOCAL WEAKNESS: 0
NERVOUS/ANXIOUS: 1
DIZZINESS: 0
SHORTNESS OF BREATH: 1
SENSORY CHANGE: 0
SHORTNESS OF BREATH: 1
HEADACHES: 0
EYE PAIN: 0
FOCAL WEAKNESS: 0
SENSORY CHANGE: 0
VOMITING: 0
STRIDOR: 0
CHILLS: 1
SPEECH CHANGE: 0
COUGH: 1
FEVER: 1
VOMITING: 0
CLAUDICATION: 0
HEADACHES: 0
PALPITATIONS: 0
NERVOUS/ANXIOUS: 1
EYE PAIN: 0
SPUTUM PRODUCTION: 0
MYALGIAS: 0
ABDOMINAL PAIN: 0
EYE DISCHARGE: 0
BACK PAIN: 0
SHORTNESS OF BREATH: 1
VOMITING: 0
MYALGIAS: 0
NAUSEA: 0
BLURRED VISION: 0
PALPITATIONS: 0
NERVOUS/ANXIOUS: 1
STRIDOR: 0
NAUSEA: 0
FEVER: 1
NAUSEA: 0
SHORTNESS OF BREATH: 1
COUGH: 1

## 2021-01-01 ASSESSMENT — LIFESTYLE VARIABLES
EVER FELT BAD OR GUILTY ABOUT YOUR DRINKING: NO
TOTAL SCORE: 0
EVER FELT BAD OR GUILTY ABOUT YOUR DRINKING: NO
HOW MANY TIMES IN THE PAST YEAR HAVE YOU HAD 5 OR MORE DRINKS IN A DAY: 0
CONSUMPTION TOTAL: INCOMPLETE
CONSUMPTION TOTAL: NEGATIVE
TOTAL SCORE: 0
ON A TYPICAL DAY WHEN YOU DRINK ALCOHOL HOW MANY DRINKS DO YOU HAVE: 0
HAVE PEOPLE ANNOYED YOU BY CRITICIZING YOUR DRINKING: NO
EVER HAD A DRINK FIRST THING IN THE MORNING TO STEADY YOUR NERVES TO GET RID OF A HANGOVER: NO
HAVE YOU EVER FELT YOU SHOULD CUT DOWN ON YOUR DRINKING: NO
HAVE PEOPLE ANNOYED YOU BY CRITICIZING YOUR DRINKING: NO
DOES PATIENT WANT TO STOP DRINKING: CANNOT ASSESS
AVERAGE NUMBER OF DAYS PER WEEK YOU HAVE A DRINK CONTAINING ALCOHOL: 0
TOTAL SCORE: 0
TOTAL SCORE: 0
DOES PATIENT WANT TO STOP DRINKING: NO
EVER HAD A DRINK FIRST THING IN THE MORNING TO STEADY YOUR NERVES TO GET RID OF A HANGOVER: NO
TOTAL SCORE: 0
ALCOHOL_USE: NO
TOTAL SCORE: 0
DO YOU DRINK ALCOHOL: NO
SUBSTANCE_ABUSE: 0
HAVE YOU EVER FELT YOU SHOULD CUT DOWN ON YOUR DRINKING: NO

## 2021-01-01 ASSESSMENT — COGNITIVE AND FUNCTIONAL STATUS - GENERAL
MOBILITY SCORE: 24
DAILY ACTIVITIY SCORE: 24
MOBILITY SCORE: 24
SUGGESTED CMS G CODE MODIFIER DAILY ACTIVITY: CH
SUGGESTED CMS G CODE MODIFIER MOBILITY: CH
SUGGESTED CMS G CODE MODIFIER DAILY ACTIVITY: CH
SUGGESTED CMS G CODE MODIFIER MOBILITY: CH
DAILY ACTIVITIY SCORE: 24

## 2021-01-01 ASSESSMENT — PAIN DESCRIPTION - PAIN TYPE
TYPE: ACUTE PAIN

## 2021-01-01 ASSESSMENT — FIBROSIS 4 INDEX
FIB4 SCORE: 2.06
FIB4 SCORE: 1.54
FIB4 SCORE: 2.32
FIB4 SCORE: 8.09
FIB4 SCORE: 2.32

## 2021-01-01 ASSESSMENT — PATIENT HEALTH QUESTIONNAIRE - PHQ9
2. FEELING DOWN, DEPRESSED, IRRITABLE, OR HOPELESS: NOT AT ALL
SUM OF ALL RESPONSES TO PHQ9 QUESTIONS 1 AND 2: 0
SUM OF ALL RESPONSES TO PHQ9 QUESTIONS 1 AND 2: 0
2. FEELING DOWN, DEPRESSED, IRRITABLE, OR HOPELESS: NOT AT ALL
1. LITTLE INTEREST OR PLEASURE IN DOING THINGS: NOT AT ALL
1. LITTLE INTEREST OR PLEASURE IN DOING THINGS: NOT AT ALL

## 2021-06-16 PROBLEM — J96.01 ACUTE HYPOXEMIC RESPIRATORY FAILURE DUE TO COVID-19 (HCC): Status: ACTIVE | Noted: 2021-01-01

## 2021-06-16 PROBLEM — U07.1 ACUTE HYPOXEMIC RESPIRATORY FAILURE DUE TO COVID-19 (HCC): Status: ACTIVE | Noted: 2021-01-01

## 2021-06-16 NOTE — ED NOTES
Med rec completed per Pt at bedside, Pt's spouse Dov ([phone]) to verify over-the-counter medication usage, and Pt's [facility] [address] ([phone]) to verify strength of Pt's levothyroxine.  Allergies reviewed with Pt. No known drug allergies.  Pt takes BOTH levothyroxine 125 mcg and levothyroxine 137 mcg for a total dose of 262 mcg per day. Pt states that she has not taken her levothyroxine since last Wednesday 6/9/2021 due to being sick.  Pt unsure of the strengths of her vitamins/supplements. Pt again states she has not taken any of her vitamins/supplements since last Wednesday.  No oral antibiotics in last 14 days.

## 2021-06-16 NOTE — ED PROVIDER NOTES
ED Provider Note    CHIEF COMPLAINT  Chief Complaint   Patient presents with   • Difficulty Breathing     numbness to hands and feet.  onset last Wed with Dx of COVID       HPI  Olga Valdes is a 50 y.o. female who presents for evaluation of trouble breathing dyspnea body aches.  The patient works at the [employer].  She recently tested positive for COVID-19 several days ago.  She has been sick for around a week.  She was noted to be profoundly hypoxic in triage and immediately brought back.  She denies focal numbness weakness or tingling but reports some paresthesias.  She reports body aches loss of taste and smell and sore throat.    REVIEW OF SYSTEMS  See HPI for further details.  Positive for cough low-grade fever body ache shortness of breath all other systems are negative.     PAST MEDICAL HISTORY  Past Medical History:   Diagnosis Date   • COVID-19 06/09/2021       FAMILY HISTORY  Noncontributory    SOCIAL HISTORY  Social History     Socioeconomic History   • Marital status:      Spouse name: Not on file   • Number of children: 2   • Years of education: Not on file   • Highest education level: Not on file   Occupational History   • Occupation: Human Resources     Employer: [employer]   Tobacco Use   • Smoking status: Never Smoker   • Smokeless tobacco: Never Used   Vaping Use   • Vaping Use: Never used   Substance and Sexual Activity   • Alcohol use: No   • Drug use: No   • Sexual activity: Yes     Partners: Male   Other Topics Concern   •  Service Not Asked   • Blood Transfusions Not Asked   • Caffeine Concern Not Asked   • Occupational Exposure Not Asked   • Hobby Hazards Not Asked   • Sleep Concern No   • Stress Concern No   • Weight Concern Not Asked   • Special Diet No   • Back Care Not Asked   • Exercise No   • Bike Helmet Not Asked   • Seat Belt Not Asked   • Self-Exams Not Asked   Social History Narrative   • Not on file     Social Determinants of Health  "    Financial Resource Strain:    • Difficulty of Paying Living Expenses:    Food Insecurity:    • Worried About Running Out of Food in the Last Year:    • Ran Out of Food in the Last Year:    Transportation Needs:    • Lack of Transportation (Medical):    • Lack of Transportation (Non-Medical):    Physical Activity:    • Days of Exercise per Week:    • Minutes of Exercise per Session:    Stress:    • Feeling of Stress :    Social Connections:    • Frequency of Communication with Friends and Family:    • Frequency of Social Gatherings with Friends and Family:    • Attends Buddhist Services:    • Active Member of Clubs or Organizations:    • Attends Club or Organization Meetings:    • Marital Status:    Intimate Partner Violence:    • Fear of Current or Ex-Partner:    • Emotionally Abused:    • Physically Abused:    • Sexually Abused:      Denies IV drugs or cigarettes  SURGICAL HISTORY  No past surgical history on file.  No major surgeries  CURRENT MEDICATIONS  Home Medications     Reviewed by Jj Do (Pharmacy Tech) on 06/16/21 at 1313  Med List Status: Complete   Medication Last Dose Status   Alum Hydroxide-Mag Carbonate (GAVISCON EXTRA STRENGTH) 160-105 MG Chew Tab 6/16/2021 Active   Cholecalciferol (VITAMIN D3 PO) 6/9/2021 Active   Cyanocobalamin (VITAMIN B-12 PO) 6/9/2021 Active   ibuprofen (MOTRIN) 200 MG Tab 6/14/2021 Active   levothyroxine (SYNTHROID) 125 MCG Tab 6/9/2021 Active   levothyroxine (SYNTHROID) 137 MCG Tab 6/9/2021 Active   Phenyleph-Doxylamine-DM-APAP (NYQUIL SEVERE COLD/FLU) 5-6.25- MG Cap 6/15/2021 Active   ZINC SULFATE PO 6/9/2021 Active                ALLERGIES  No Known Allergies    PHYSICAL EXAM  VITAL SIGNS: /50   Pulse 67   Temp 37.2 °C (99 °F) (Temporal)   Resp 20   Ht 1.702 m (5' 7\")   Wt 78.8 kg (173 lb 11.6 oz)   LMP 05/24/2021 (Approximate)   SpO2 96%   BMI 27.21 kg/m²       Constitutional: Ill-appearing dyspneic  HENT: Normocephalic, Atraumatic, " Bilateral external ears normal, Oropharynx moist, No oral exudates, Nose normal.   Eyes: PERRLA, EOMI, Conjunctiva normal, No discharge.   Neck: Normal range of motion, No tenderness, Supple, No stridor.   Cardiovascular: Normal heart rate, Normal rhythm, No murmurs, No rubs, No gallops.   Thorax & Lungs: Bilateral rhonchi with wheezing  Abdomen: Bowel sounds normal, Soft, No tenderness, No masses, No pulsatile masses.   Skin: Warm, Dry, No erythema, No rash.   Extremities: Intact distal pulses, No edema, No tenderness, No cyanosis, No clubbing.   Musculoskeletal: Good range of motion in all major joints. No tenderness to palpation or major deformities noted.   Neurologic: Alert & oriented x 3, Normal motor function, Normal sensory function, No focal deficits noted.   Psychiatric: Anxious    CT-CTA CHEST PULMONARY ARTERY W/ RECONS   Final Result      1.  Bilateral groundglass opacities are most pronounced in the periphery of the lungs. Findings are consistent with Covid pneumonia            DX-CHEST-PORTABLE (1 VIEW)   Final Result      No acute cardiac or pulmonary abnormalities are identified.        Results for orders placed or performed during the hospital encounter of 06/16/21   D-DIMER   Result Value Ref Range    D-Dimer Screen 2.77 (H) 0.00 - 0.50 ug/mL (FEU)   CBC WITH DIFFERENTIAL   Result Value Ref Range    WBC 3.6 (L) 4.8 - 10.8 K/uL    RBC 4.97 4.20 - 5.40 M/uL    Hemoglobin 14.9 12.0 - 16.0 g/dL    Hematocrit 43.7 37.0 - 47.0 %    MCV 87.9 81.4 - 97.8 fL    MCH 30.0 27.0 - 33.0 pg    MCHC 34.1 33.6 - 35.0 g/dL    RDW 39.6 35.9 - 50.0 fL    Platelet Count 112 (L) 164 - 446 K/uL    MPV 11.1 9.0 - 12.9 fL    Neutrophils-Polys 69.20 44.00 - 72.00 %    Lymphocytes 24.30 22.00 - 41.00 %    Monocytes 5.90 0.00 - 13.40 %    Eosinophils 0.00 0.00 - 6.90 %    Basophils 0.30 0.00 - 1.80 %    Immature Granulocytes 0.30 0.00 - 0.90 %    Nucleated RBC 0.00 /100 WBC    Neutrophils (Absolute) 2.48 2.00 - 7.15 K/uL     Lymphs (Absolute) 0.87 (L) 1.00 - 4.80 K/uL    Monos (Absolute) 0.21 0.00 - 0.85 K/uL    Eos (Absolute) 0.00 0.00 - 0.51 K/uL    Baso (Absolute) 0.01 0.00 - 0.12 K/uL    Immature Granulocytes (abs) 0.01 0.00 - 0.11 K/uL    NRBC (Absolute) 0.00 K/uL   Comp Metabolic Panel   Result Value Ref Range    Sodium 129 (L) 135 - 145 mmol/L    Potassium 4.0 3.6 - 5.5 mmol/L    Chloride 95 (L) 96 - 112 mmol/L    Co2 22 20 - 33 mmol/L    Anion Gap 12.0 7.0 - 16.0    Glucose 129 (H) 65 - 99 mg/dL    Bun 10 8 - 22 mg/dL    Creatinine 0.86 0.50 - 1.40 mg/dL    Calcium 8.0 (L) 8.5 - 10.5 mg/dL    AST(SGOT) 128 (H) 12 - 45 U/L    ALT(SGPT) 46 2 - 50 U/L    Alkaline Phosphatase 35 30 - 99 U/L    Total Bilirubin 0.3 0.1 - 1.5 mg/dL    Albumin 3.2 3.2 - 4.9 g/dL    Total Protein 6.1 6.0 - 8.2 g/dL    Globulin 2.9 1.9 - 3.5 g/dL    A-G Ratio 1.1 g/dL   TROPONIN   Result Value Ref Range    Troponin T 6 6 - 19 ng/L   COV-2, FLU A/B, AND RSV BY PCR (2-4 HOURS CEPHEID): Collect NP swab in VTM    Specimen: Nasopharyngeal; Respirate   Result Value Ref Range    Influenza virus A RNA Negative Negative    Influenza virus B, PCR Negative Negative    RSV, PCR Negative Negative    SARS-CoV-2 by PCR DETECTED (AA)     SARS-CoV-2 Source NP Swab    PROCALCITONIN   Result Value Ref Range    Procalcitonin 1.16 (H) <0.25 ng/mL   ESTIMATED GFR   Result Value Ref Range    GFR If African American >60 >60 mL/min/1.73 m 2    GFR If Non African American >60 >60 mL/min/1.73 m 2   PERIPHERAL SMEAR REVIEW   Result Value Ref Range    Peripheral Smear Review see below    HCG QUAL SERUM   Result Value Ref Range    Beta-Hcg Qualitative Serum Negative Negative         COURSE & MEDICAL DECISION MAKING  Pertinent Labs & Imaging studies reviewed. (See chart for details)  Patient presents here with cough hypoxia with a apparent recent positive Covid test earlier this week.  She was noted to be profoundly hypoxic and placed on supplemental oxygen.  The patient here has a  relatively clear chest x-ray and a profoundly elevated D-dimer therefore CT angiogram of the chest was performed but there is only groundglass opacities suggestive of Covid pneumonitis without pulmonary embolism.  I added on blood cultures once her procalcitonin was noted to be elevated.  She was given first dose Decadron here and will be admitted to internal medicine    FINAL IMPRESSION  1.  COVID-19 pneumonia with hypoxia    Electronically signed by: Fernando Vázquez M.D., 6/16/2021 12:45 PM

## 2021-06-16 NOTE — ED TRIAGE NOTES
To triage via w/c with report of   Chief Complaint   Patient presents with   • Difficulty Breathing     numbness to hands and feet.  onset last Wed with Dx of COVID   reports increased SOB and numbness onset yesterday  Per ED Tech, pt hypoxic at 76% on RA on arrival.  Placed on O2 via NRB mask with good results.

## 2021-06-17 PROBLEM — E87.1 HYPONATREMIA: Status: ACTIVE | Noted: 2021-01-01

## 2021-06-17 NOTE — PROGRESS NOTES
Hospital Medicine Daily Progress Note    Date of Service  6/17/2021    Chief Complaint  50 y.o. female admitted 6/16/2021 with shortness of breath     Hospital Course  This is a 49 y/o F with PMHX of hypothyroidism who was admitted on 6/16/21 after presenting to ER complaining of SOB. 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  Generally she had been doing well with home management for the past week, however she had progressive dyspnea today which prompted her to seek attention in our emergency department.  She was noted to be profoundly hypoxic and was placed on high flow.  Patient admitted for further treatment.     Interval Problem Update  Patient seen and examined, still feeling SOB, having a cough. On high flow oxygen 50 L  COnt on dexamethasone and remdesivir     Consultants/Specialty  None     Code Status  Full Code    Disposition  Home when medically cleared     Review of Systems  Review of Systems   Constitutional: Positive for chills. Negative for fever.   HENT: Negative for ear discharge, hearing loss and tinnitus.    Eyes: Negative for pain and discharge.   Respiratory: Positive for shortness of breath.    Cardiovascular: Negative for chest pain and palpitations.   Gastrointestinal: Negative for abdominal pain, nausea and vomiting.   Genitourinary: Negative for dysuria and urgency.   Musculoskeletal: Negative for myalgias and neck pain.   Skin: Negative for itching and rash.   Neurological: Negative for dizziness and headaches.   All other systems reviewed and are negative.       Physical Exam  Temp:  [36.2 °C (97.2 °F)-37.2 °C (99 °F)] 36.6 °C (97.9 °F)  Pulse:  [63-91] 71  Resp:  [16-24] 16  BP: ()/(41-58) 102/48  SpO2:  [87 %-97 %] 92 %    Physical Exam  Vitals and nursing note reviewed.   Constitutional:       General: She is in acute distress.   Eyes:      General: No scleral icterus.         Right eye: No discharge.         Left eye: No discharge.   Cardiovascular:      Rate and Rhythm: Regular rhythm.      Heart sounds: No murmur heard.   No gallop.    Pulmonary:      Breath sounds: No stridor. Rhonchi present.   Abdominal:      General: There is no distension.      Tenderness: There is no abdominal tenderness. There is no guarding.   Skin:     General: Skin is warm.      Coloration: Skin is not jaundiced.   Neurological:      General: No focal deficit present.      Mental Status: She is alert. Mental status is at baseline.      Cranial Nerves: No cranial nerve deficit.         Fluids    Intake/Output Summary (Last 24 hours) at 6/17/2021 1028  Last data filed at 6/16/2021 2000  Gross per 24 hour   Intake 240 ml   Output --   Net 240 ml       Laboratory  Recent Labs     06/16/21  1256 06/17/21  0228   WBC 3.6* 2.6*   RBC 4.97 5.29   HEMOGLOBIN 14.9 15.7   HEMATOCRIT 43.7 47.4*   MCV 87.9 89.6   MCH 30.0 29.7   MCHC 34.1 33.1*   RDW 39.6 40.2   PLATELETCT 112* 117*   MPV 11.1 11.6     Recent Labs     06/16/21  1256 06/17/21  0228   SODIUM 129* 132*   POTASSIUM 4.0 3.8   CHLORIDE 95* 96   CO2 22 27   GLUCOSE 129* 183*   BUN 10 10   CREATININE 0.86 0.69   CALCIUM 8.0* 8.4*                   Imaging  CT-CTA CHEST PULMONARY ARTERY W/ RECONS   Final Result      1.  Bilateral groundglass opacities are most pronounced in the periphery of the lungs. Findings are consistent with Covid pneumonia            DX-CHEST-PORTABLE (1 VIEW)   Final Result      No acute cardiac or pulmonary abnormalities are identified.           Assessment/Plan  * Acute hypoxemic respiratory failure due to COVID-19 (HCC)  Assessment & Plan  Patient now on high flow oxygen 50L  Cont on dexamethasone   CRP is 2.73 D-dimer 2.7   CTA chest neg for PE.   Rt Protocol  Proning   Wean off O2 as tolerated     Hyponatremia  Assessment & Plan   - serum sodium: 132.00 mmol/L (06/17/2021 2:28:00) up from 129.00 mmol/L (06/16/2021  12:56:00)  Improving  Monitor     Hypothyroidism- (present on admission)  Assessment & Plan  Cont on levothyroxine        VTE prophylaxis: lovenox

## 2021-06-17 NOTE — ASSESSMENT & PLAN NOTE
- serum sodium: 132.00 mmol/L (06/17/2021 2:28:00) up from 129.00 mmol/L (06/16/2021 12:56:00)  Improving  Monitor

## 2021-06-17 NOTE — CARE PLAN
The patient is Watcher - Medium risk of patient condition declining or worsening         Progress made toward(s) clinical / shift goals:  n/a    Patient is not progressing towards the following goals:      Problem: Respiratory  Goal: Patient will achieve/maintain optimum respiratory ventilation and gas exchange  Outcome: Not Progressing   Pt on 50L 80% HHF. Pt encouraged to prone, use IS, cough and deep breathe. Pt verbalizes understanding.

## 2021-06-17 NOTE — H&P
"Hospital Medicine History & Physical Note    Date of Service  6/16/2021    Primary Care Physician  No primary care provider on file.    Consultants  None    Code Status  Full Code    Chief Complaint  Chief Complaint   Patient presents with   • Difficulty Breathing     numbness to hands and feet.  onset last Wed with Dx of COVID       History of Presenting Illness  50 y.o. female who presented 6/16/2021 with worsening dyspnea, significant hypoxemia.  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  Declined immunization due to \"all the variants floating around\".  Generally she had been doing well with home management for the past week, however she had progressive dyspnea today which prompted her to seek attention in our emergency department.  She was noted to be profoundly hypoxic in triage and immediately brought back, is now requiring greater than 6 L via nonrebreather to maintain normal saturations.  She has already been provided with 1 dose of dexamethasone.  Patient reports that her breathing is much less labored at this juncture, helped with the supplemental O2.  Acuity is acute, severity is severe, onset is insidious, course is progressive, timing is irrelevant, no particular alleviating or exacerbating factors, nature is of an air hunger.       Review of System  All systems reviewed and negative except as noted per above.    Past Medical History   has a past medical history of COVID-19 (06/09/2021).    Surgical History   has no past surgical history on file.     Family History  family history includes Heart Attack in her mother; No Known Problems in her father.     Social History   reports that she has never smoked. She has never used smokeless tobacco. She reports that she does not drink alcohol and does not use drugs.    Allergies  No Known Allergies    Medications  Prior to Admission Medications "   Prescriptions Last Dose Informant Patient Reported? Taking?   Alum Hydroxide-Mag Carbonate (GAVISCON EXTRA STRENGTH) 160-105 MG Chew Tab 6/16/2021 at 0900 Significant Other Yes Yes   Sig: Chew 1 tablet 3 times a day as needed (Heartburn).   Cholecalciferol (VITAMIN D3 PO) 6/9/2021 at Unknown time Patient Yes Yes   Sig: Take 1 tablet by mouth every morning.   Cyanocobalamin (VITAMIN B-12 PO) 6/9/2021 at Unknown time Patient Yes Yes   Sig: Take 1 tablet by mouth every day.   Phenyleph-Doxylamine-DM-APAP (NYQUIL SEVERE COLD/FLU) 5-6.25- MG Cap 6/15/2021 at 2100 Significant Other Yes Yes   Sig: Take 2 Capsules by mouth 2 times a day as needed (Cold & Flu Symptoms).   ZINC SULFATE PO 6/9/2021 at Unknown time Patient Yes Yes   Sig: Take 1 tablet by mouth every day.   ibuprofen (MOTRIN) 200 MG Tab 6/14/2021 at PRN Significant Other Yes Yes   Sig: Take 400 mg by mouth every 6 hours as needed for Mild Pain or Fever. 2 tablets = 400 mg.   levothyroxine (SYNTHROID) 125 MCG Tab 6/9/2021 at Unknown time Patient's Home Pharmacy No No   Sig: Take 1 Tab by mouth Every morning on an empty stomach.   levothyroxine (SYNTHROID) 137 MCG Tab 6/9/2021 at Unknown time Patient's Home Pharmacy No No   Sig: Take 1 Tab by mouth Every morning on an empty stomach.      Facility-Administered Medications: None       Physical Exam  Temp:  [37.2 °C (99 °F)] 37.2 °C (99 °F)  Pulse:  [67-91] 68  Resp:  [20] 20  BP: ()/(50-56) 98/55  SpO2:  [87 %-97 %] 96 %    General: No acute distress, on 10 L nonrebreather.  HEENT atraumatic, normocephalic, pupils equal round reactive to light  Neck: No JVD  Chest: Respirations are unlabored at this time.   Cardiac: Physiologic S1 and S2  Abdomen: Soft, nontender, nondistended  Extremities: Without clubbing, cyanosis or edema  Neuro: Cranial nerves II through XII are grossly intact.  Psych: No anxiety, judgement intact.          Laboratory:  Recent Labs     06/16/21  1256   WBC 3.6*   RBC 4.97    HEMOGLOBIN 14.9   HEMATOCRIT 43.7   MCV 87.9   MCH 30.0   MCHC 34.1   RDW 39.6   PLATELETCT 112*   MPV 11.1     Recent Labs     06/16/21  1256   SODIUM 129*   POTASSIUM 4.0   CHLORIDE 95*   CO2 22   GLUCOSE 129*   BUN 10   CREATININE 0.86   CALCIUM 8.0*     Recent Labs     06/16/21  1256   ALTSGPT 46   ASTSGOT 128*   ALKPHOSPHAT 35   TBILIRUBIN 0.3   GLUCOSE 129*         No results for input(s): NTPROBNP in the last 72 hours.      Recent Labs     06/16/21  1256   TROPONINT 6       Imaging:  CT-CTA CHEST PULMONARY ARTERY W/ RECONS   Final Result      1.  Bilateral groundglass opacities are most pronounced in the periphery of the lungs. Findings are consistent with Covid pneumonia            DX-CHEST-PORTABLE (1 VIEW)   Final Result      No acute cardiac or pulmonary abnormalities are identified.            Assessment/Plan:  I anticipate this patient will require at least two midnights for appropriate medical management, necessitating inpatient admission.    Acute hypoxemic respiratory failure due to COVID-19 (HCC)  Assessment & Plan  Patient provided with instructions regarding proning as tolerated.  We reviewed treatment options to include dexamethasone and remdesivir.  Dexamethasone has been initiated in the emergency department and will be continued, 6 mg oral daily x10 days total.  I do believe she meets criteria for remdesivir, I have requested review from Dr. Acosta of the pulmonary/critical care medicine team, formal consult not necessary, simply need orders to come from pulm/ID per [facility] protocol.  Assistance appreciated.  CRP has been ordered and pending.  DVT prophylaxis will be with low molecular weight heparin's as ordered.  We will generally avoid overhydration, I see no indications for IV fluids at this juncture.

## 2021-06-17 NOTE — DOCUMENTATION QUERY
"                                                                         Pending sale to [facility]                                                                       Query Response Note      PATIENT:               PETER AYALA  ACCT #:                  [id number]  MRN:                     [id number]  :                      1970  ADMIT DATE:       2021 12:34 PM  DISCH DATE:          RESPONDING  PROVIDER #:        [id number]           QUERY TEXT:    COVID-19 pneumonia with hypoxia is documented in the ER MD Final Impression with 21 CT chest results of \"Findings are consistent with Covid pneumonia\".  Please clarify status of this condition:    NOTE:  If an appropriate response is not listed below, please respond with a new note.       The patient's Clinical Indicators include:  ER MD Final Impression: COVID-19 pneumonia with hypoxia   21 CT chest results: Bilateral groundglass opacities are most pronounced in the periphery of the lungs.Findings are consistent with Covid pneumonia    Treatment: Remdesivir, CT chest, CXR, Dexamethasone, RT protocol  Risk Factors: Covid-19, Acute respiratory failure    Thank you,  Fabio Mcelroy RN, BSN  Clinical   Connect via Add[employer]  .  Options provided:   -- COVID-19 pneumonia is ruled in   -- COVID-19 pneumonia is ruled out   -- Unable to determine      Query created by: Fabio Mcelroy on 2021 1:28 PM    RESPONSE TEXT:    COVID-19 pneumonia is ruled in          Electronically signed by:  SANDY SPANN MD 2021 1:48 PM              "

## 2021-06-17 NOTE — ASSESSMENT & PLAN NOTE
Cont on dexamethasone and remdesivir and lasix    D-dimer 2.7 crp trending down   CTA chest neg for PE.   Rt Protocol  Proning   Patient oxygen requirement trending up now  on high flow 60 L 100% FiO2 and also on top of it she has a non rebreather but still sating 87%.  Case has been discussed with critical care physician Dr. Ramirez and patient will be transferred to ICU  Appreciate rec.

## 2021-06-17 NOTE — PROGRESS NOTES
4 Eyes Skin Assessment Completed by Elvira RN and DOUGLAS Hills.    Head WDL  Ears WDL  Nose WDL  Mouth WDL  Neck WDL  Breast/Chest WDL  Shoulder Blades WDL  Spine WDL  (R) Arm/Elbow/Hand WDL  (L) Arm/Elbow/Hand WDL  Abdomen WDL  Groin WDL  Scrotum/Coccyx/Buttocks WDL  (R) Leg WDL  (L) Leg WDL  (R) Heel/Foot/Toe WDL  (L) Heel/Foot/Toe WDL          Devices In Places Pulse Ox      Interventions In Place Gray Ear Foams and Pillows    Possible Skin Injury No    Pictures Uploaded Into Epic N/A  Wound Consult Placed N/A  RN Wound Prevention Protocol Ordered No

## 2021-06-17 NOTE — PROGRESS NOTES
Increased pt to 15L oxymask. She is in the prone position and cannot move from this position without her o2 saturation dropping into the low 80s. Instructed pt to call if she needs anything and to remain in the prone position

## 2021-06-18 NOTE — PROGRESS NOTES
Hospital Medicine Daily Progress Note    Date of Service  6/18/2021    Chief Complaint  50 y.o. female admitted 6/16/2021 with shortness of breath     Hospital Course  This is a 49 y/o F with PMHX of hypothyroidism who was admitted on 6/16/21 after presenting to ER complaining of SOB. 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  Generally she had been doing well with home management for the past week, however she had progressive dyspnea today which prompted her to seek attention in our emergency department.  She was noted to be profoundly hypoxic and was placed on high flow.  Patient admitted for further treatment.     Interval Problem Update  Patient seen and examined, still feeling SOB, having a cough. On high flow oxygen 60 L and 100%FiO2  Cont on dexamethasone and remdesivir     Consultants/Specialty  None     Code Status  Full Code    Disposition  Home when medically cleared     Review of Systems  Review of Systems   Constitutional: Positive for chills. Negative for fever.   HENT: Negative for ear discharge, hearing loss and tinnitus.    Eyes: Negative for pain and discharge.   Respiratory: Positive for shortness of breath.    Cardiovascular: Negative for chest pain and palpitations.   Gastrointestinal: Negative for abdominal pain, nausea and vomiting.   Genitourinary: Negative for dysuria and urgency.   Musculoskeletal: Negative for myalgias and neck pain.   Skin: Negative for itching and rash.   Neurological: Negative for dizziness and headaches.   All other systems reviewed and are negative.       Physical Exam  Temp:  [36.1 °C (97 °F)-37 °C (98.6 °F)] 37 °C (98.6 °F)  Pulse:  [50-87] 50  Resp:  [16-24] 22  BP: ()/(44-56) 94/44  SpO2:  [83 %-98 %] 89 %    Physical Exam  Vitals and nursing note reviewed.   Constitutional:       General: She is in acute distress.   Eyes:      General: No scleral  icterus.        Right eye: No discharge.         Left eye: No discharge.   Cardiovascular:      Rate and Rhythm: Regular rhythm.      Heart sounds: No murmur heard.   No gallop.    Pulmonary:      Breath sounds: No stridor. Rhonchi present.   Abdominal:      General: There is no distension.      Tenderness: There is no abdominal tenderness. There is no guarding.   Skin:     General: Skin is warm.      Coloration: Skin is not jaundiced.   Neurological:      General: No focal deficit present.      Mental Status: She is alert. Mental status is at baseline.      Cranial Nerves: No cranial nerve deficit.         Fluids    Intake/Output Summary (Last 24 hours) at 6/18/2021 1203  Last data filed at 6/18/2021 1000  Gross per 24 hour   Intake 120 ml   Output --   Net 120 ml       Laboratory  Recent Labs     06/16/21  1256 06/17/21  0228 06/18/21  0700   WBC 3.6* 2.6* 4.3*   RBC 4.97 5.29 4.67   HEMOGLOBIN 14.9 15.7 14.1   HEMATOCRIT 43.7 47.4* 41.2   MCV 87.9 89.6 88.2   MCH 30.0 29.7 30.2   MCHC 34.1 33.1* 34.2   RDW 39.6 40.2 39.1   PLATELETCT 112* 117* 149*   MPV 11.1 11.6 10.8     Recent Labs     06/16/21  1256 06/17/21  0228   SODIUM 129* 132*   POTASSIUM 4.0 3.8   CHLORIDE 95* 96   CO2 22 27   GLUCOSE 129* 183*   BUN 10 10   CREATININE 0.86 0.69   CALCIUM 8.0* 8.4*                   Imaging  CT-CTA CHEST PULMONARY ARTERY W/ RECONS   Final Result      1.  Bilateral groundglass opacities are most pronounced in the periphery of the lungs. Findings are consistent with Covid pneumonia            DX-CHEST-PORTABLE (1 VIEW)   Final Result      No acute cardiac or pulmonary abnormalities are identified.           Assessment/Plan  * Acute hypoxemic respiratory failure due to COVID-19 ([facility])  Assessment & Plan  Patient now on high flow oxygen 60L % FiO2  Cont on dexamethasone and remdesivir    D-dimer 2.7 crp trending down   CTA chest neg for PE.   Rt Protocol  Proning   Wean off O2 as tolerated      Hyponatremia  Assessment & Plan   - serum sodium: 132.00 mmol/L (06/17/2021 2:28:00) up from 129.00 mmol/L (06/16/2021 12:56:00)  Improving  Monitor     Hypothyroidism- (present on admission)  Assessment & Plan  Cont on levothyroxine        VTE prophylaxis: lovenox

## 2021-06-18 NOTE — CARE PLAN
The patient is Watcher - Medium risk of patient condition declining or worsening         Progress made toward(s) clinical / shift goals:  Pt laying on side and encouraged to use IS. Pt on HHF 60L 100% FiO2.    Patient is not progressing towards the following goals:      Problem: Respiratory  Goal: Patient will achieve/maintain optimum respiratory ventilation and gas exchange  Outcome: Not Progressing

## 2021-06-18 NOTE — CARE PLAN
Problem: Humidified High Flow Nasal Cannula  Goal: Maintain adequate oxygenation dependent on patient condition  Description: 1.  Implement humidified high flow oxygen therapy  2.  Titrate high flow oxygen to maintain appropriate SpO2  Outcome: Not Progressing  Flowsheets  Taken 6/18/2021 1157  Indication: All other patients: SpO2 less than 90% despite conventional supplemental oxygen devices  Outcome: Normoxia  Taken 6/18/2021 1047  O2 (LPM): 60  FiO2%: 100 %

## 2021-06-18 NOTE — PROGRESS NOTES
0732-RT notified d/t pt oxygen saturation 83%-84%.   0735-RT at bedside. HHF increased. See Epic flowsheet

## 2021-06-18 NOTE — DISCHARGE PLANNING
Care Transition Team Discharge Planning    Anticipated Discharge Disposition: TBD    Action: Lsw spoke with patient to complete the CTT assessment. The patient reported this being her first admission to [facility]. She reported that she was ill for seven days with a fever and chills. She reported living with her spouse. She presented A&Ox4 e/b able to confirm the information on the facesheet as being accurate. She stated that prior to becoming ill, she was independent with ADLs and IADLs. She denied having any substance abuse or mental health histories.    Barriers to Discharge: Awaiting medical clearance.    Plan: Lsw will assist medical team with discharge planning.    Care Transition Team Assessment    Information Source  Orientation Level: Oriented to place  Information Given By: Patient  Informant's Name: Olga  Who is responsible for making decisions for patient? : Patient    Readmission Evaluation  Is this a readmission?: No    Elopement Risk  Legal Hold: No  Ambulatory or Self Mobile in Wheelchair: No-Not an Elopement Risk  Disoriented: No  Elopement Risk: Not at Risk for Elopement    Interdisciplinary Discharge Planning  Patient or legal guardian wants to designate a caregiver: No    Discharge Preparedness  What is your plan after discharge?: Uncertain - pending medical team collaboration  What are your discharge supports?: Spouse  Prior Functional Level: Ambulatory, Independent with Activities of Daily Living, Independent with Medication Management  Difficulity with ADLs: None  Difficulity with IADLs: None    Functional Assesment  Prior Functional Level: Ambulatory, Independent with Activities of Daily Living, Independent with Medication Management    Finances  Financial Barriers to Discharge: No  Prescription Coverage: Yes    Vision / Hearing Impairment  Vision Impairment : No  Hearing Impairment : No         Advance Directive  Advance Directive?: None  Advance Directive offered?: AD Booklet  refused    Domestic Abuse  Have you ever been the victim of abuse or violence?: No  Physical Abuse or Sexual Abuse: No  Verbal Abuse or Emotional Abuse: No  Possible Abuse/Neglect Reported to:: Not Applicable    Psychological Assessment  History of Substance Abuse: None  History of Psychiatric Problems: No  Non-compliant with Treatment: No  Newly Diagnosed Illness: Yes    Discharge Risks or Barriers  Discharge risks or barriers?: No PCP  Patient risk factors: No PCP    Anticipated Discharge Information  Discharge Disposition: Still a Patient (30)  Discharge Address:  [address] [facility]  Discharge Contact Phone Number: [phone]

## 2021-06-18 NOTE — CARE PLAN
The patient is Unstable - High likelihood or risk of patient condition declining or worsening         Progress made toward(s) clinical / shift goals:  Pt had to have high flow increased to 50L at 70% and is unable to sit up or turn over in the bed without desatting to the low 80s    Patient is not progressing towards the following goals:      Problem: Respiratory  Goal: Patient will achieve/maintain optimum respiratory ventilation and gas exchange  Outcome: Not Progressing

## 2021-06-18 NOTE — CARE PLAN
Respiratory Update    HFNC 40L 65%. Pt was able to be weaned today.     Pt tolerating current treatments well with no adverse reactions.

## 2021-06-19 NOTE — CARE PLAN
Problem: Knowledge Deficit - Standard  Goal: Patient and family/care givers will demonstrate understanding of plan of care, disease process/condition, diagnostic tests and medications  Outcome: Met     Problem: Communication  Goal: The ability to communicate needs accurately and effectively will improve  Outcome: Met     Problem: Pain - Standard  Goal: Alleviation of pain or a reduction in pain to the patient’s comfort goal  Outcome: Met   The patient is Unstable - High likelihood or risk of patient condition declining or worsening       Goals: Wean O2

## 2021-06-19 NOTE — PROGRESS NOTES
I was asked to evaluate Ms. Valdes for oxygen desaturation. Olga is a 50 year old female with a history of hypothyroidism who was admitted 6/16/21 for acute hypoxic respiratory failure secondary to covid infection. She is on 60 L high flow at 100% with a nonrebreather at max wall O2 as well and with any movement she desaturated into the 70's and takes a long time to recover back to the low 90's. She is on remdesivir and dexamethasone as well as antibiotics for elevated procalcitonin. When I arrived to bedside she was lying on her left side saturating 92-94% with low work of breathing. At this time I would recommend placing a castro catheter for now to prevent further desaturation events while walking to the restroom and continuing diuresis with an additional dose of lasix ordered by the covering hospitalist. Additionally I have ordered CRP, if it is beginning to rise she may be a candidate for tocilizumab. I also recommend she spends as much time prone throughout the day and night as she can tolerate. At this time she does not need to be transferred to the ICU, please reach out to on call intensivist if you have any further questions or concerns. This is day 9 of symptoms for her.     Nicolas Werner M.D.

## 2021-06-19 NOTE — CARE PLAN
The patient is Unstable - High likelihood or risk of patient condition declining or worsening         Progress made toward(s) clinical / shift goals:  Pt is unstable with her 02 saturation. Currently on 60L high flow at 100% with a non rebreather on also at max wall o2 allowed. Pt is unable to reposition or move without her O2 dropping to 78-84%    Patient is not progressing towards the following goals:      Problem: Respiratory  Goal: Patient will achieve/maintain optimum respiratory ventilation and gas exchange  Outcome: Not Progressing

## 2021-06-19 NOTE — PROGRESS NOTES
Hospital Medicine Daily Progress Note    Date of Service  6/19/2021    Chief Complaint  50 y.o. female admitted 6/16/2021 with shortness of breath     Hospital Course  This is a 49 y/o F with PMHX of hypothyroidism who was admitted on 6/16/21 after presenting to ER complaining of SOB. 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  Generally she had been doing well with home management for the past week, however she had progressive dyspnea today which prompted her to seek attention in our emergency department.  She was noted to be profoundly hypoxic and was placed on high flow.  Patient admitted for further treatment.     Interval Problem Update  Patient seen and examined, still feeling SOB, having a cough.maxed out on high flow 60 L and 100 %FiO2 and also on top oif it she is on a non rebreather but still desating.  Critical care physician was consulted and case has been discussed with Dr. Ramirez. Patient will transferred to ICU appreciate rec.  Cont on dexamethasone and remdesivir and also on lasix     Consultants/Specialty  None     Code Status  Full Code    Disposition  Home when medically cleared     Review of Systems  Review of Systems   Constitutional: Positive for chills. Negative for fever.   HENT: Negative for ear discharge, hearing loss and tinnitus.    Eyes: Negative for pain and discharge.   Respiratory: Positive for shortness of breath.    Cardiovascular: Negative for chest pain and palpitations.   Gastrointestinal: Negative for abdominal pain, nausea and vomiting.   Genitourinary: Negative for dysuria and urgency.   Musculoskeletal: Negative for myalgias and neck pain.   Skin: Negative for itching and rash.   Neurological: Negative for dizziness and headaches.   All other systems reviewed and are negative.       Physical Exam  Temp:  [36.1 °C (97 °F)-36.5 °C (97.7 °F)] 36.5 °C (97.7 °F)  Pulse:   [50-79] 79  Resp:  [20-36] 36  BP: ()/(35-64) 96/62  SpO2:  [88 %-94 %] 90 %    Physical Exam  Vitals and nursing note reviewed.   Constitutional:       General: She is in acute distress.   Eyes:      General: No scleral icterus.        Right eye: No discharge.         Left eye: No discharge.   Cardiovascular:      Rate and Rhythm: Regular rhythm.      Heart sounds: No murmur heard.   No gallop.    Pulmonary:      Breath sounds: No stridor. Rhonchi present.   Abdominal:      General: There is no distension.      Tenderness: There is no abdominal tenderness. There is no guarding.   Skin:     General: Skin is warm.      Coloration: Skin is not jaundiced.   Neurological:      General: No focal deficit present.      Mental Status: She is alert. Mental status is at baseline.      Cranial Nerves: No cranial nerve deficit.         Fluids    Intake/Output Summary (Last 24 hours) at 6/19/2021 0952  Last data filed at 6/19/2021 0500  Gross per 24 hour   Intake 480 ml   Output 1600 ml   Net -1120 ml       Laboratory  Recent Labs     06/16/21  1256 06/17/21  0228 06/18/21  0700   WBC 3.6* 2.6* 4.3*   RBC 4.97 5.29 4.67   HEMOGLOBIN 14.9 15.7 14.1   HEMATOCRIT 43.7 47.4* 41.2   MCV 87.9 89.6 88.2   MCH 30.0 29.7 30.2   MCHC 34.1 33.1* 34.2   RDW 39.6 40.2 39.1   PLATELETCT 112* 117* 149*   MPV 11.1 11.6 10.8     Recent Labs     06/17/21  0228 06/18/21  1711 06/19/21  0341   SODIUM 132* 138 141   POTASSIUM 3.8 4.0 3.4*   CHLORIDE 96 100 100   CO2 27 26 27   GLUCOSE 183* 151* 165*   BUN 10 16 15   CREATININE 0.69 0.62 0.55   CALCIUM 8.4* 8.1* 7.9*                   Imaging  DX-CHEST-LIMITED (1 VIEW)   Final Result         1.  Pulmonary infiltrates, new since prior study.      CT-CTA CHEST PULMONARY ARTERY W/ RECONS   Final Result      1.  Bilateral groundglass opacities are most pronounced in the periphery of the lungs. Findings are consistent with Covid pneumonia            DX-CHEST-PORTABLE (1 VIEW)   Final Result      No  acute cardiac or pulmonary abnormalities are identified.           Assessment/Plan  * Acute hypoxemic respiratory failure due to COVID-19 (HCC)  Assessment & Plan  Cont on dexamethasone and remdesivir and lasix    D-dimer 2.7 crp trending down   CTA chest neg for PE.   Rt Protocol  Proning   Patient oxygen requirement trending up now  on high flow 60 L 100% FiO2 and also on top of it she has a non rebreather but still sating 87%.  Case has been discussed with critical care physician Dr. Ramirez and patient will be transferred to ICU  Appreciate rec.    Hyponatremia  Assessment & Plan   - serum sodium: 132.00 mmol/L (06/17/2021 2:28:00) up from 129.00 mmol/L (06/16/2021 12:56:00)  Improving  Monitor     Hypothyroidism- (present on admission)  Assessment & Plan  Cont on levothyroxine        VTE prophylaxis: lovenox

## 2021-06-19 NOTE — CARE PLAN
The patient is Unstable - High likelihood or risk of patient condition declining or worsening         Progress made toward(s) clinical / shift goals:  Pt able to vocalize feelings, especially those of anxiety and respond to feedback and redirection.     Patient is not progressing towards the following goals:

## 2021-06-19 NOTE — CONSULTS
Critical Care Consultation    Date of consult: 6/19/2021    Referring Physician  Maria Victoria Greer M.D.    Reason for Consultation  Max HFNC and NRB from covid pneumonia    History of Presenting Illness  50 y.o. female who presented 6/16/2021 with Hypothyroidism was dx a couple days prior to admission on 6/16 per report today is day 9 of Covid. She was hypoxic needing 6l n/c on admission and she has progressed while here needing maxium support of HFNC + NRB and is desaturating with any movement or talking. I have been asked to consult on patient. She has not had a fever. She feels sick to her stomach, no ana chest pain or pleurisy, leg pain or swelling. HR 70's SBP 's sat 90-94%. Net negative. Receiving dexamethasone and remdesivir. She appoints her  as the point of contact if she is unable too do so. I have told her the concern of being on a ventilator with covid but if she required it I would recommend it with her young age and lack of co morbidities or obesity. CPR is a bit more controversial but overall prognosis is extremely poor.     Code Status  Full Code    Review of Systems  Review of Systems   Constitutional: Positive for malaise/fatigue. Negative for chills and fever.   HENT: Negative for hearing loss and sinus pain.    Eyes: Negative for blurred vision and discharge.   Respiratory: Positive for shortness of breath. Negative for cough, hemoptysis, sputum production, wheezing and stridor.    Cardiovascular: Negative for chest pain, orthopnea, claudication and leg swelling.   Gastrointestinal: Positive for abdominal pain and nausea. Negative for blood in stool, constipation, diarrhea and vomiting.   Genitourinary: Negative for dysuria, frequency, hematuria and urgency.   Musculoskeletal: Negative for back pain, joint pain, myalgias and neck pain.   Neurological: Negative for dizziness, tremors, sensory change, speech change, focal weakness, seizures, weakness and headaches.   Psychiatric/Behavioral:  Negative for depression, hallucinations and substance abuse. The patient is not nervous/anxious.        Past Medical History   has a past medical history of COVID-19 (06/09/2021). She also has no past medical history of Anxiety, Depression, Diabetes (HCC), High cholesterol, Hypertension, or Seizures (HCC).    Surgical History   has no past surgical history on file.    Family History  family history includes Heart Attack in her mother; No Known Problems in her father.    Social History   reports that she has never smoked. She has never used smokeless tobacco. She reports that she does not drink alcohol and does not use drugs.    Medications  Home Medications     Reviewed by Jj Do (Pharmacy Tech) on 06/16/21 at 1313  Med List Status: Complete   Medication Last Dose Status   Alum Hydroxide-Mag Carbonate (GAVISCON EXTRA STRENGTH) 160-105 MG Chew Tab 6/16/2021 Active   Cholecalciferol (VITAMIN D3 PO) 6/9/2021 Active   Cyanocobalamin (VITAMIN B-12 PO) 6/9/2021 Active   ibuprofen (MOTRIN) 200 MG Tab 6/14/2021 Active   levothyroxine (SYNTHROID) 125 MCG Tab 6/9/2021 Active   levothyroxine (SYNTHROID) 137 MCG Tab 6/9/2021 Active   Phenyleph-Doxylamine-DM-APAP (NYQUIL SEVERE COLD/FLU) 5-6.25- MG Cap 6/15/2021 Active   ZINC SULFATE PO 6/9/2021 Active              Current Facility-Administered Medications   Medication Dose Route Frequency Provider Last Rate Last Admin   • furosemide (LASIX) injection 40 mg  40 mg Intravenous BID DIURETIC Maria Victoria Greer M.D.   40 mg at 06/19/21 0540   • guaiFENesin dextromethorphan (ROBITUSSIN DM) 100-10 MG/5ML syrup 5 mL  5 mL Oral Q6HRS PRN Maria Victoria Greer M.D.   5 mL at 06/19/21 0035   • benzonatate (TESSALON) capsule 100 mg  100 mg Oral TID PRN Maria Victoria Greer M.D.   100 mg at 06/19/21 0035   • fluticasone (FLONASE) nasal spray 100 mcg  2 Spray Nasal DAILY Maria Victoria Greer M.D.   100 mcg at 06/18/21 1946   • dexamethasone (DECADRON) tablet 6 mg  6 mg Oral DAILY Maria Victoria Greer M.D.   6 mg at  06/19/21 0549   • acetaminophen (Tylenol) tablet 650 mg  650 mg Oral Q6HRS PRN Maria Victoria Greer M.D.   650 mg at 06/18/21 1020   • ondansetron (ZOFRAN) syringe/vial injection 4 mg  4 mg Intravenous Q6HRS PRN Ankit Samuels M.D.       • ondansetron (ZOFRAN ODT) dispertab 4 mg  4 mg Oral Q6HRS PRN Ankit Samuels M.D.       • enoxaparin (LOVENOX) inj 40 mg  40 mg Subcutaneous DAILY Ankit Samuels M.D.   40 mg at 06/19/21 0550   • vitamin D (cholecalciferol) tablet 1,000 Units  1,000 Units Oral QAM Ankit Samuels M.D.   1,000 Units at 06/19/21 0550   • levothyroxine (SYNTHROID) tablet 125 mcg  125 mcg Oral AM MAKAYLA Samuels M.D.   125 mcg at 06/19/21 0550   • levothyroxine (SYNTHROID) tablet 137 mcg  137 mcg Oral AM  Ankit Samuels M.D.   137 mcg at 06/19/21 0550   • remdesivir (Veklury) 100 mg in  mL IVPB  100 mg Intravenous DAILY AT 1800 rBannon Acosta M.D.   Stopped at 06/18/21 1933       Allergies  No Known Allergies    Vital Signs last 24 hours  Temp:  [36.1 °C (97 °F)-37.1 °C (98.7 °F)] 37.1 °C (98.7 °F)  Pulse:  [59-87] 87  Resp:  [20-36] 21  BP: ()/(35-64) 112/49  SpO2:  [88 %-94 %] 89 %    Physical Exam  Physical Exam  Vitals and nursing note reviewed.   Constitutional:       General: She is not in acute distress.     Appearance: She is ill-appearing.      Comments: Ill appearing female in mild to moderate respiratory distress   HENT:      Head: Normocephalic.      Mouth/Throat:      Mouth: Mucous membranes are dry.   Eyes:      Pupils: Pupils are equal, round, and reactive to light.   Cardiovascular:      Rate and Rhythm: Normal rate.      Heart sounds: No murmur heard.   No friction rub. No gallop.    Pulmonary:      Effort: Respiratory distress present.      Breath sounds: No stridor. No wheezing or rhonchi.      Comments: Fine velcro crackles  Abdominal:      General: There is no distension.      Palpations: There is no mass.      Tenderness: There is no abdominal tenderness. There is no  guarding or rebound.      Hernia: No hernia is present.   Musculoskeletal:         General: No swelling or tenderness.   Skin:     Coloration: Skin is not jaundiced or pale.   Neurological:      Mental Status: She is alert and oriented to person, place, and time.      Cranial Nerves: No cranial nerve deficit.      Sensory: No sensory deficit.      Motor: No weakness.      Coordination: Coordination normal.   Psychiatric:         Mood and Affect: Mood normal.         Fluids    Intake/Output Summary (Last 24 hours) at 6/19/2021 1255  Last data filed at 6/19/2021 0500  Gross per 24 hour   Intake 360 ml   Output 1600 ml   Net -1240 ml       Laboratory  Recent Results (from the past 48 hour(s))   CRP QUANTITIVE (NON-CARDIAC)    Collection Time: 06/17/21  2:15 PM   Result Value Ref Range    Stat C-Reactive Protein 1.58 (H) 0.00 - 0.75 mg/dL   CBC WITHOUT DIFFERENTIAL    Collection Time: 06/18/21  7:00 AM   Result Value Ref Range    WBC 4.3 (L) 4.8 - 10.8 K/uL    RBC 4.67 4.20 - 5.40 M/uL    Hemoglobin 14.1 12.0 - 16.0 g/dL    Hematocrit 41.2 37.0 - 47.0 %    MCV 88.2 81.4 - 97.8 fL    MCH 30.2 27.0 - 33.0 pg    MCHC 34.2 33.6 - 35.0 g/dL    RDW 39.1 35.9 - 50.0 fL    Platelet Count 149 (L) 164 - 446 K/uL    MPV 10.8 9.0 - 12.9 fL   Procalcitonin    Collection Time: 06/18/21  7:00 AM   Result Value Ref Range    Procalcitonin 0.28 (H) <0.25 ng/mL   Comp Metabolic Panel    Collection Time: 06/18/21  5:11 PM   Result Value Ref Range    Sodium 138 135 - 145 mmol/L    Potassium 4.0 3.6 - 5.5 mmol/L    Chloride 100 96 - 112 mmol/L    Co2 26 20 - 33 mmol/L    Anion Gap 12.0 7.0 - 16.0    Glucose 151 (H) 65 - 99 mg/dL    Bun 16 8 - 22 mg/dL    Creatinine 0.62 0.50 - 1.40 mg/dL    Calcium 8.1 (L) 8.5 - 10.5 mg/dL    AST(SGOT) 86 (H) 12 - 45 U/L    ALT(SGPT) 60 (H) 2 - 50 U/L    Alkaline Phosphatase 37 30 - 99 U/L    Total Bilirubin 0.4 0.1 - 1.5 mg/dL    Albumin 3.3 3.2 - 4.9 g/dL    Total Protein 6.0 6.0 - 8.2 g/dL    Globulin 2.7  1.9 - 3.5 g/dL    A-G Ratio 1.2 g/dL   ESTIMATED GFR    Collection Time: 06/18/21  5:11 PM   Result Value Ref Range    GFR If African American >60 >60 mL/min/1.73 m 2    GFR If Non African American >60 >60 mL/min/1.73 m 2   CRP QUANTITIVE (NON-CARDIAC)    Collection Time: 06/19/21  3:41 AM   Result Value Ref Range    Stat C-Reactive Protein 0.55 0.00 - 0.75 mg/dL   Comp Metabolic Panel    Collection Time: 06/19/21  3:41 AM   Result Value Ref Range    Sodium 141 135 - 145 mmol/L    Potassium 3.4 (L) 3.6 - 5.5 mmol/L    Chloride 100 96 - 112 mmol/L    Co2 27 20 - 33 mmol/L    Anion Gap 14.0 7.0 - 16.0    Glucose 165 (H) 65 - 99 mg/dL    Bun 15 8 - 22 mg/dL    Creatinine 0.55 0.50 - 1.40 mg/dL    Calcium 7.9 (L) 8.5 - 10.5 mg/dL    AST(SGOT) 75 (H) 12 - 45 U/L    ALT(SGPT) 61 (H) 2 - 50 U/L    Alkaline Phosphatase 38 30 - 99 U/L    Total Bilirubin 0.5 0.1 - 1.5 mg/dL    Albumin 3.2 3.2 - 4.9 g/dL    Total Protein 6.1 6.0 - 8.2 g/dL    Globulin 2.9 1.9 - 3.5 g/dL    A-G Ratio 1.1 g/dL   ESTIMATED GFR    Collection Time: 06/19/21  3:41 AM   Result Value Ref Range    GFR If African American >60 >60 mL/min/1.73 m 2    GFR If Non African American >60 >60 mL/min/1.73 m 2   CBC WITH DIFFERENTIAL    Collection Time: 06/19/21 10:07 AM   Result Value Ref Range    WBC 8.6 4.8 - 10.8 K/uL    RBC 5.60 (H) 4.20 - 5.40 M/uL    Hemoglobin 16.7 (H) 12.0 - 16.0 g/dL    Hematocrit 49.4 (H) 37.0 - 47.0 %    MCV 88.2 81.4 - 97.8 fL    MCH 29.8 27.0 - 33.0 pg    MCHC 33.8 33.6 - 35.0 g/dL    RDW 39.5 35.9 - 50.0 fL    Platelet Count 204 164 - 446 K/uL    MPV 11.1 9.0 - 12.9 fL    Neutrophils-Polys 93.00 (H) 44.00 - 72.00 %    Lymphocytes 2.60 (L) 22.00 - 41.00 %    Monocytes 3.50 0.00 - 13.40 %    Eosinophils 0.00 0.00 - 6.90 %    Basophils 0.00 0.00 - 1.80 %    Nucleated RBC 0.00 /100 WBC    Neutrophils (Absolute) 8.08 (H) 2.00 - 7.15 K/uL    Lymphs (Absolute) 0.22 (L) 1.00 - 4.80 K/uL    Monos (Absolute) 0.30 0.00 - 0.85 K/uL    Eos  (Absolute) 0.00 0.00 - 0.51 K/uL    Baso (Absolute) 0.00 0.00 - 0.12 K/uL    NRBC (Absolute) 0.00 K/uL   D-DIMER    Collection Time: 06/19/21 10:07 AM   Result Value Ref Range    D-Dimer Screen 1.05 (H) 0.00 - 0.50 ug/mL (FEU)   CRP QUANTITIVE (NON-CARDIAC)    Collection Time: 06/19/21 10:07 AM   Result Value Ref Range    Stat C-Reactive Protein 0.51 0.00 - 0.75 mg/dL   PROCALCITONIN    Collection Time: 06/19/21 10:07 AM   Result Value Ref Range    Procalcitonin 0.18 <0.25 ng/mL   DIFFERENTIAL MANUAL    Collection Time: 06/19/21 10:07 AM   Result Value Ref Range    Bands-Stabs 0.90 0.00 - 10.00 %    Manual Diff Status PERFORMED    PERIPHERAL SMEAR REVIEW    Collection Time: 06/19/21 10:07 AM   Result Value Ref Range    Peripheral Smear Review see below    PLATELET ESTIMATE    Collection Time: 06/19/21 10:07 AM   Result Value Ref Range    Plt Estimation Normal    MORPHOLOGY    Collection Time: 06/19/21 10:07 AM   Result Value Ref Range    RBC Morphology Normal    HEPATITIS PANEL ACUTE(4 COMPONENTS)    Collection Time: 06/19/21 11:00 AM   Result Value Ref Range    Hepatitis B Surface Antigen Non-Reactive Non-Reactive    Hepatitis B Cors Ab,IgM Non-Reactive Non-Reactive    Hepatitis A Virus Ab, IgM Non-Reactive Non-Reactive       Imaging  DX-CHEST-LIMITED (1 VIEW)   Final Result         1.  Pulmonary infiltrates, new since prior study.      CT-CTA CHEST PULMONARY ARTERY W/ RECONS   Final Result      1.  Bilateral groundglass opacities are most pronounced in the periphery of the lungs. Findings are consistent with Covid pneumonia            DX-CHEST-PORTABLE (1 VIEW)   Final Result      No acute cardiac or pulmonary abnormalities are identified.          Assessment/Plan  * Acute hypoxemic respiratory failure due to COVID-19 (HCC)  Assessment & Plan  SARS-CoV-2/COVID-19  Recommends labs/Dx:   Avoid unnecessary image study/CT scan and use lung US     Management:  Be extremely mindful of fluids and target euvolemia to net  negative fluid balance with early initiation of pressors  Avoid: NSAIDs, bronchoscopy unless absolute necessary for care  Remdesivir for 5 days if requiring high levels of oxygen, avoid in ALT > 5x or GFR < 30  Dexamethasone 6mg x 10days  Monitor for hyperinflamatory phase multiorgan failure, cardiomyopathy, shock, DIC and viral induced HLH   Vte prophylaxis COVID 19 seems to be procoagulant consider full dose anticoagulation with low risk factors for bleeding  Strict contact, droplet/airborne, glasses protection and critical meticulous hand hygiene    She does not meet criteria for Toci per hospital protocol CRP 0.5 I have sent quantiferon and hepatitis panel already  Procal negative I will stop antibiotics  Continue with HFNC + NRB with goal Sat > 85% monitor work of breathing closely  Monitor need for intubation    Hypothyroidism- (present on admission)  Assessment & Plan  Continue home synthroid      Discussed patient condition and risk of morbidity and/or mortality with Hospitalist, RN, RT, Pharmacy, Charge nurse / hot rounds and Patient.      The patient remains critically ill from covid pneumonia needing HFNC and NRB with close monitoring and titration.  Critical care time = 50 minutes in directly providing and coordinating critical care and extensive data review.  No time overlap and excludes procedures.

## 2021-06-19 NOTE — ASSESSMENT & PLAN NOTE
SARS-CoV-2/COVID-19  Worsening acute hypoxic resp failure requiring intubation 6/24, proning, but unfortunately went into cardiac arrest last night.   Pt's clinical course evolved into worsening shock and multiorgan failure and profound metabolic acidemia, shock and bleeding.   Currently on full vent support, FiO2 100%, PEEP 14.   Serial ABGs.   On dexamethasone

## 2021-06-20 PROBLEM — E87.6 HYPOKALEMIA: Status: ACTIVE | Noted: 2021-01-01

## 2021-06-20 PROBLEM — R73.9 HYPERGLYCEMIA: Status: ACTIVE | Noted: 2021-01-01

## 2021-06-20 NOTE — HOSPITAL COURSE
6/24 worsening hypoxia at 7pm, s/p intubation  6/24 upon proning, pt became hemodynamically unstable and coded at 9pm. PEA arrest, 1 round of CPR    6/24 shock - multifactorial: septic, cardiogenic  Bedside echo with evidence of RV dilation and mild RV dysfunction    S/p tPA 50mg x1 and 40mg IV x1  6/25 S/p emergent EKOS by IR at 1am     6/25 am. Post EKOS, pt remains in profound shock with NE at 100, EPI at 40, vasopressin at 0.04, dobutamine at 5, phenylephrine at 300  Full vent support 100%/PEEP 10  PH 6.9  Lactate rising quickly to 20 at 6am

## 2021-06-20 NOTE — CARE PLAN
Problem: Humidified High Flow Nasal Cannula  Goal: Maintain adequate oxygenation dependent on patient condition  Description: 1.  Implement humidified high flow oxygen therapy  2.  Titrate high flow oxygen to maintain appropriate SpO2  Outcome: Progressing         Respiratory Update    Treatment modality: HHFNC  Frequency: Q4    50L   90%    Pt tolerating current treatments well with no adverse reactions.

## 2021-06-20 NOTE — CARE PLAN
The patient is Watcher - Medium risk of patient condition declining or worsening         Progress made toward(s) clinical / shift goals:    Problem: Psychosocial  Goal: Patient's ability to verbalize feelings about condition will improve  Outcome: Progressing     Problem: Hemodynamics  Goal: Patient's hemodynamics, fluid balance and neurologic status will be stable or improve  Outcome: Progressing     Problem: Respiratory  Goal: Patient will achieve/maintain optimum respiratory ventilation and gas exchange  Outcome: Progressing       Frequent rounding/monitoring, no other complaints, pt utilizes call light appropriately.

## 2021-06-20 NOTE — PROGRESS NOTES
"Critical Care Progress Note    Date of admission  6/16/2021    Chief Complaint  50 y.o. female admitted 6/16/2021 with acute hypoxic respiratory failure secondary to COVID-19 pneumonia    Hospital Course  \"50 y.o. female who presented 6/16/2021 with Hypothyroidism was dx a couple days prior to admission on 6/16 per report today is day 9 of Covid. She was hypoxic needing 6l n/c on admission and she has progressed while here needing maxium support of HFNC + NRB and is desaturating with any movement or talking. I have been asked to consult on patient. She has not had a fever. She feels sick to her stomach, no ana chest pain or pleurisy, leg pain or swelling. HR 70's SBP 's sat 90-94%. Net negative. Receiving dexamethasone and remdesivir. She appoints her  as the point of contact if she is unable too do so. I have told her the concern of being on a ventilator with covid but if she required it I would recommend it with her young age and lack of co morbidities or obesity. CPR is a bit more controversial but overall prognosis is extremely poor.\"      Interval Problem Update  Reviewed last 24 hour events:   - Transferred to the ICU for respiratory failure 2/2 COVID   - Neuro: AOx4   - HR: 80s to 90s   - SBP: 90s to 110s   - GI: Tolerating diet   - UOP: 1.9L/24 hrs   - Ya: yes   - Tm: 39   - Lines: PIV   - PPx: GI not indicated, DVT lovenox   -  HFNC 60L 100%   - CXR (personally reviewed and compared to prior): no new   - Decadron day 5/10, remdesivir day 5/5    Review of Systems  Review of Systems   Constitutional: Positive for fever and malaise/fatigue. Negative for chills.   Eyes: Negative for blurred vision.   Respiratory: Positive for cough and shortness of breath. Negative for sputum production and stridor.    Cardiovascular: Negative for chest pain.   Gastrointestinal: Negative for abdominal pain, nausea and vomiting.   Genitourinary: Negative for dysuria.   Musculoskeletal: Negative for myalgias. "   Skin: Negative for rash.   Neurological: Negative for dizziness, sensory change and focal weakness.   Psychiatric/Behavioral: The patient is nervous/anxious.         Vital Signs for last 24 hours   Temp:  [36.5 °C (97.7 °F)-37.3 °C (99.1 °F)] 37.3 °C (99.1 °F)  Pulse:  [70-97] 89  Resp:  [18-36] 23  BP: ()/(37-86) 99/45  SpO2:  [79 %-95 %] 87 %    Hemodynamic parameters for last 24 hours       Respiratory Information for the last 24 hours       Physical Exam   Physical Exam  Vitals and nursing note reviewed.   Constitutional:       General: She is in acute distress.      Appearance: She is ill-appearing.   HENT:      Head: Normocephalic and atraumatic.      Right Ear: External ear normal.      Left Ear: External ear normal.      Nose:      Comments: High flow nasal cannula place     Mouth/Throat:      Pharynx: Oropharynx is clear.   Eyes:      Extraocular Movements: Extraocular movements intact.      Conjunctiva/sclera: Conjunctivae normal.   Cardiovascular:      Rate and Rhythm: Normal rate and regular rhythm.      Pulses: Normal pulses.   Pulmonary:      Effort: Tachypnea present.      Breath sounds: Decreased breath sounds and rales present.   Musculoskeletal:         General: Normal range of motion.      Cervical back: Neck supple.      Right lower leg: No edema.      Left lower leg: No edema.   Skin:     General: Skin is warm and dry.      Capillary Refill: Capillary refill takes less than 2 seconds.   Neurological:      General: No focal deficit present.      Mental Status: She is alert.      Cranial Nerves: No cranial nerve deficit.      Sensory: No sensory deficit.      Motor: No weakness.   Psychiatric:         Mood and Affect: Mood is anxious.         Medications  Current Facility-Administered Medications   Medication Dose Route Frequency Provider Last Rate Last Admin   • potassium chloride (KCL) ivpb 10 mEq  10 mEq Intravenous Q HOUR Luis Miguel Bower Jr., D.O.       • melatonin tablet 5 mg  5 mg  Oral Nightly Lennox Ramirez M.D.   5 mg at 06/19/21 1953   • furosemide (LASIX) injection 40 mg  40 mg Intravenous BID DIURETIC Maria Victoria Greer M.D.   40 mg at 06/20/21 0544   • guaiFENesin dextromethorphan (ROBITUSSIN DM) 100-10 MG/5ML syrup 5 mL  5 mL Oral Q6HRS PRN Maria Victoria Greer M.D.   5 mL at 06/19/21 0035   • benzonatate (TESSALON) capsule 100 mg  100 mg Oral TID PRN Maria Victoria Greer M.D.   100 mg at 06/19/21 0035   • fluticasone (FLONASE) nasal spray 100 mcg  2 Spray Nasal DAILY Maria Victoria Greer M.D.   100 mcg at 06/20/21 0545   • dexamethasone (DECADRON) tablet 6 mg  6 mg Oral DAILY Maria Victoria Greer M.D.   6 mg at 06/20/21 0545   • acetaminophen (Tylenol) tablet 650 mg  650 mg Oral Q6HRS PRN Maria Victoria Greer M.D.   650 mg at 06/20/21 0217   • ondansetron (ZOFRAN) syringe/vial injection 4 mg  4 mg Intravenous Q6HRS PRN Ankit Samuels M.D.   4 mg at 06/19/21 1526   • ondansetron (ZOFRAN ODT) dispertab 4 mg  4 mg Oral Q6HRS PRN Ankit Samuels M.D.       • enoxaparin (LOVENOX) inj 40 mg  40 mg Subcutaneous DAILY Ankit Samuels M.D.   40 mg at 06/20/21 0545   • vitamin D (cholecalciferol) tablet 1,000 Units  1,000 Units Oral QAM Ankit Samuels M.D.   1,000 Units at 06/20/21 0545   • levothyroxine (SYNTHROID) tablet 125 mcg  125 mcg Oral AM ES Ankit Samuels M.D.   125 mcg at 06/20/21 0545   • levothyroxine (SYNTHROID) tablet 137 mcg  137 mcg Oral AM ES Ankit Samuels M.D.   137 mcg at 06/20/21 0545   • remdesivir (Veklury) 100 mg in  mL IVPB  100 mg Intravenous DAILY AT 1800 Brannon Acosta M.D.   Stopped at 06/19/21 2053       Fluids    Intake/Output Summary (Last 24 hours) at 6/20/2021 0744  Last data filed at 6/20/2021 0530  Gross per 24 hour   Intake 910 ml   Output 1900 ml   Net -990 ml       Laboratory          Recent Labs     06/18/21  1711 06/19/21  0341 06/20/21  0604   SODIUM 138 141 147*   POTASSIUM 4.0 3.4* 1.8*   CHLORIDE 100 100 123*   CO2 26 27 18*   BUN 16 15 10   CREATININE 0.62 0.55 0.32*   CALCIUM 8.1* 7.9* 3.6*      Recent Labs     06/18/21  1711 06/19/21  0341 06/20/21  0604   ALTSGPT 60* 61* 28   ASTSGOT 86* 75* 36   ALKPHOSPHAT 37 38 17*   TBILIRUBIN 0.4 0.5 0.3   GLUCOSE 151* 165* 79     Recent Labs     06/18/21  0700 06/18/21  1711 06/19/21  0341 06/19/21  1007 06/20/21  0604   WBC 4.3*  --   --  8.6  --    NEUTSPOLYS  --   --   --  93.00*  --    LYMPHOCYTES  --   --   --  2.60*  --    MONOCYTES  --   --   --  3.50  --    EOSINOPHILS  --   --   --  0.00  --    BASOPHILS  --   --   --  0.00  --    ASTSGOT  --  86* 75*  --  36   ALTSGPT  --  60* 61*  --  28   ALKPHOSPHAT  --  37 38  --  17*   TBILIRUBIN  --  0.4 0.5  --  0.3     Recent Labs     06/18/21  0700 06/19/21  1007   RBC 4.67 5.60*   HEMOGLOBIN 14.1 16.7*   HEMATOCRIT 41.2 49.4*   PLATELETCT 149* 204       Imaging  X-Ray:  I have personally reviewed the images and compared with prior images.    Assessment/Plan  * Acute hypoxemic respiratory failure due to COVID-19 (HCC)- (present on admission)  Assessment & Plan  SARS-CoV-2/COVID-19  Recommends labs/Dx:   Avoid unnecessary image study/CT scan and use lung US     Management:  Be extremely mindful of fluids and target euvolemia to net negative fluid balance with early initiation of pressors  Avoid: NSAIDs, bronchoscopy unless absolute necessary for care  Remdesivir for 5 days if requiring high levels of oxygen, avoid in ALT > 5x or GFR < 30  Dexamethasone 6mg x 10days  Monitor for hyperinflamatory phase multiorgan failure, cardiomyopathy, shock, DIC and viral induced HLH   Vte prophylaxis  Strict contact, droplet/airborne, glasses protection and critical meticulous hand hygiene  She does not meet criteria for Toci per hospital protocol CRP 0.5 I have sent quantiferon and hepatitis panel already  Continue with HFNC + NRB with goal Sat > 85% monitor work of breathing closely  Monitor need for intubation  Evaluate for VTE with echo and lower extremity Doppler  Encourage self proning    Hyperglycemia- (present on  admission)  Assessment & Plan  Secondary to steroids  Monitor for need to initiate sliding scale insulin    Hypokalemia  Assessment & Plan  Aggressive repletion to maintain potassium greater than 4    Hyponatremia  Assessment & Plan  Resolved    Hypothyroidism- (present on admission)  Assessment & Plan  Continue home synthroid       VTE:  Lovenox  Ulcer: Not Indicated  Lines: Ya Catheter  Ongoing indication addressed    I have performed a physical exam and reviewed and updated ROS and Plan today (6/20/2021). In review of yesterday's note (6/19/2021), there are no changes except as documented above.     Titrating high flow nasal cannula, at high risk for worsening respiratory failure which required intubation for mechanical ventilation.  This patient is critically ill, at high risk for decompensation leading to worsening vital organ dysfunction and death without critical care interventions.    Discussed patient condition and risk of morbidity and/or mortality with RN, RT, Pharmacy, Code status disscussed, Charge nurse / hot rounds and Patient     The patient remains critically ill.  Critical care time = 35 minutes in directly providing and coordinating critical care and extensive data review.  No time overlap and excludes procedures.

## 2021-06-21 NOTE — PROGRESS NOTES
"Critical Care Progress Note    Date of admission  6/16/2021    Chief Complaint  50 y.o. female admitted 6/16/2021 with acute hypoxic respiratory failure secondary to COVID-19 pneumonia    Hospital Course  \"50 y.o. female who presented 6/16/2021 with Hypothyroidism was dx a couple days prior to admission on 6/16 per report today is day 9 of Covid. She was hypoxic needing 6l n/c on admission and she has progressed while here needing maxium support of HFNC + NRB and is desaturating with any movement or talking. I have been asked to consult on patient. She has not had a fever. She feels sick to her stomach, no ana chest pain or pleurisy, leg pain or swelling. HR 70's SBP 's sat 90-94%. Net negative. Receiving dexamethasone and remdesivir. She appoints her  as the point of contact if she is unable too do so. I have told her the concern of being on a ventilator with covid but if she required it I would recommend it with her young age and lack of co morbidities or obesity. CPR is a bit more controversial but overall prognosis is extremely poor.\"      Interval Problem Update  Reviewed last 24 hour events:   -No acute events overnight   - Neuro: Alert and oriented x4   - HR: SR   - SBP: 90s-110s   - GI: tolerating diet   - UOP: 1.6L/24hrs   - Ya: yes   - Tm: 38.3   - Lines: PIV   - PPx: GI not indicated, DVT lovenox   - HFNC 60L/100%   - CXR (personally reviewed and compared to prior): no new   - Decadron day 6 of 10   - Phos and KCl    Yesterday   - Transferred to the ICU for respiratory failure 2/2 COVID   - Neuro: AOx4   - HR: 80s to 90s   - SBP: 90s to 110s   - GI: Tolerating diet   - UOP: 1.9L/24 hrs   - Ya: yes   - Tm: 39   - Lines: PIV   - PPx: GI not indicated, DVT lovenox   -  HFNC 60L 100%   - CXR (personally reviewed and compared to prior): no new   - Decadron day 5/10, remdesivir day 5/5    Review of Systems  Review of Systems   Constitutional: Positive for fever. Negative for chills.   Eyes: " Negative for blurred vision.   Respiratory: Positive for cough and shortness of breath. Negative for sputum production and stridor.    Cardiovascular: Negative for chest pain.   Gastrointestinal: Negative for abdominal pain, nausea and vomiting.   Genitourinary: Negative for dysuria.   Musculoskeletal: Negative for myalgias.   Skin: Negative for rash.   Neurological: Negative for dizziness, sensory change and focal weakness.   Psychiatric/Behavioral: The patient is nervous/anxious.         Vital Signs for last 24 hours   Pulse:  [65-95] 79  Resp:  [16-39] 24  BP: ()/(36-60) 96/36  SpO2:  [63 %-100 %] 91 %    Hemodynamic parameters for last 24 hours       Respiratory Information for the last 24 hours       Physical Exam   Physical Exam  Vitals and nursing note reviewed.   Constitutional:       General: She is in acute distress.      Appearance: She is ill-appearing.   HENT:      Head: Normocephalic and atraumatic.      Right Ear: External ear normal.      Left Ear: External ear normal.      Nose:      Comments: High flow nasal cannula place     Mouth/Throat:      Pharynx: Oropharynx is clear.   Eyes:      Extraocular Movements: Extraocular movements intact.      Conjunctiva/sclera: Conjunctivae normal.   Cardiovascular:      Rate and Rhythm: Normal rate and regular rhythm.      Pulses: Normal pulses.   Pulmonary:      Effort: Tachypnea present.      Breath sounds: Decreased breath sounds and rales present.   Musculoskeletal:         General: Normal range of motion.      Cervical back: Neck supple.      Right lower leg: No edema.      Left lower leg: No edema.   Skin:     General: Skin is warm and dry.      Capillary Refill: Capillary refill takes less than 2 seconds.   Neurological:      General: No focal deficit present.      Mental Status: She is alert.      Cranial Nerves: No cranial nerve deficit.      Sensory: No sensory deficit.      Motor: No weakness.   Psychiatric:         Mood and Affect: Mood is  anxious.         Medications  Current Facility-Administered Medications   Medication Dose Route Frequency Provider Last Rate Last Admin   • melatonin tablet 5 mg  5 mg Oral Nightly Lennox Ramirez M.D.   5 mg at 06/20/21 2036   • furosemide (LASIX) injection 40 mg  40 mg Intravenous BID DIURETIC Maria Victoria Greer M.D.   40 mg at 06/21/21 0609   • guaiFENesin dextromethorphan (ROBITUSSIN DM) 100-10 MG/5ML syrup 5 mL  5 mL Oral Q6HRS PRN Maria Victoria Greer M.D.   5 mL at 06/21/21 0835   • benzonatate (TESSALON) capsule 100 mg  100 mg Oral TID PRN Maria Victoria Greer M.D.   100 mg at 06/20/21 2154   • fluticasone (FLONASE) nasal spray 100 mcg  2 Spray Nasal DAILY Maria Victoria Greer M.D.   100 mcg at 06/21/21 0610   • dexamethasone (DECADRON) tablet 6 mg  6 mg Oral DAILY Maria Victoria Greer M.D.   6 mg at 06/21/21 0610   • acetaminophen (Tylenol) tablet 650 mg  650 mg Oral Q6HRS PRN Maria Victoria Greer M.D.   650 mg at 06/21/21 0835   • ondansetron (ZOFRAN) syringe/vial injection 4 mg  4 mg Intravenous Q6HRS PRN Ankit Samuels M.D.   4 mg at 06/20/21 0903   • ondansetron (ZOFRAN ODT) dispertab 4 mg  4 mg Oral Q6HRS PRN Ankit Samuels M.D.       • enoxaparin (LOVENOX) inj 40 mg  40 mg Subcutaneous DAILY Ankit Samuels M.D.   40 mg at 06/21/21 0609   • vitamin D (cholecalciferol) tablet 1,000 Units  1,000 Units Oral QAM Ankit Samuels M.D.   1,000 Units at 06/21/21 0610   • levothyroxine (SYNTHROID) tablet 125 mcg  125 mcg Oral AM ES Ankit Samuels M.D.   125 mcg at 06/21/21 0610   • levothyroxine (SYNTHROID) tablet 137 mcg  137 mcg Oral AM ES Ankit Samuels M.D.   137 mcg at 06/21/21 0609       Fluids    Intake/Output Summary (Last 24 hours) at 6/21/2021 1038  Last data filed at 6/21/2021 0800  Gross per 24 hour   Intake 230 ml   Output 1050 ml   Net -820 ml       Laboratory          Recent Labs     06/20/21  0800 06/20/21  1255 06/21/21  0640   SODIUM 136 136 136   POTASSIUM 2.9* 3.7 3.0*   CHLORIDE 93* 93* 95*   CO2 29 31 32   BUN 16 16 17   CREATININE 0.71  0.72 0.66   MAGNESIUM 1.7  --  2.4   PHOSPHORUS  --   --  2.3*   CALCIUM 8.1* 8.0* 8.1*     Recent Labs     06/19/21 0341 06/19/21 0341 06/20/21  0604 06/20/21  0604 06/20/21  0800 06/20/21  1255 06/21/21  0640   ALTSGPT 61*  --  28  --  69*  --   --    ASTSGOT 75*  --  36  --  73*  --   --    ALKPHOSPHAT 38  --  17*  --  40  --   --    TBILIRUBIN 0.5  --  0.3  --  0.8  --   --    GLUCOSE 165*   < > 79   < > 164* 221* 150*    < > = values in this interval not displayed.     Recent Labs     06/19/21 0341 06/19/21 1007 06/20/21  0604 06/20/21  0800 06/21/21  0640   WBC  --  8.6  --   --  7.5   NEUTSPOLYS  --  93.00*  --   --  89.50*   LYMPHOCYTES  --  2.60*  --   --  7.90*   MONOCYTES  --  3.50  --   --  1.30   EOSINOPHILS  --  0.00  --   --  0.30   BASOPHILS  --  0.00  --   --  0.10   ASTSGOT 75*  --  36 73*  --    ALTSGPT 61*  --  28 69*  --    ALKPHOSPHAT 38  --  17* 40  --    TBILIRUBIN 0.5  --  0.3 0.8  --      Recent Labs     06/19/21 1007 06/21/21  0640   RBC 5.60* 5.16   HEMOGLOBIN 16.7* 15.1   HEMATOCRIT 49.4* 45.2   PLATELETCT 204 182       Imaging  X-Ray:  I have personally reviewed the images and compared with prior images.    Assessment/Plan  * Acute hypoxemic respiratory failure due to COVID-19 (HCC)- (present on admission)  Assessment & Plan  SARS-CoV-2/COVID-19  Recommends labs/Dx:   Avoid unnecessary image study/CT scan and use lung US     Management:  Be extremely mindful of fluids and target euvolemia to net negative fluid balance with early initiation of pressors  Avoid: NSAIDs, bronchoscopy unless absolute necessary for care  Remdesivir for 5 days if requiring high levels of oxygen, avoid in ALT > 5x or GFR < 30  Dexamethasone 6mg x 10days  Monitor for hyperinflamatory phase multiorgan failure, cardiomyopathy, shock, DIC and viral induced HLH   Vte prophylaxis  Strict contact, droplet/airborne, glasses protection and critical meticulous hand hygiene  She does not meet criteria for Toci per  hospital protocol CRP 0.5 I have sent quantiferon and hepatitis panel already  Continue with HFNC + NRB with goal Sat > 85% monitor work of breathing closely  Continue to monitor for need for intubation mechanical ventilation  Evaluated for VTE with echo and lower extremity Doppler: Doppler negative, echo with normal RV  Encouraging self proning    Hyperglycemia- (present on admission)  Assessment & Plan  Secondary to steroids  Monitor for need to initiate sliding scale insulin    Hypokalemia  Assessment & Plan  Aggressive repletion to maintain potassium greater than 4    Hyponatremia  Assessment & Plan  Resolved    Hypothyroidism- (present on admission)  Assessment & Plan  Continue home synthroid       VTE:  Lovenox  Ulcer: Not Indicated  Lines: Ya Catheter  Ongoing indication addressed    I have performed a physical exam and reviewed and updated ROS and Plan today (6/21/2021). In review of yesterday's note (6/20/2021), there are no changes except as documented above.     Titrating high flow nasal cannula, monitoring for worsening respiratory failure which would require intubation for mechanical ventilation.  This patient is critically ill, at high risk for decompensation leading to worsening vital organ dysfunction and death without critical care interventions.    Discussed patient condition and risk of morbidity and/or mortality with RN, RT, Pharmacy, Code status disscussed, Charge nurse / hot rounds and Patient     The patient remains critically ill.  Critical care time = 41 minutes in directly providing and coordinating critical care and extensive data review.  No time overlap and excludes procedures.

## 2021-06-21 NOTE — CARE PLAN
The patient is Watcher - Medium risk of patient condition declining or worsening         Progress made toward(s) clinical / shift goals:    Problem: Psychosocial  Goal: Patient's level of anxiety will decrease  Outcome: Progressing  Goal: Patient's ability to verbalize feelings about condition will improve  Outcome: Progressing     Problem: Fluid Volume  Goal: Fluid volume balance will be maintained  Outcome: Progressing

## 2021-06-21 NOTE — PROGRESS NOTES
ISOLATION PRECAUTIONS EDUCATION    Educated PATIENT, FAMILY, S.O: patient on isolation for COVID-19.    Educated on reason for isolation, how the infection may be transmitted, and how to help prevent transmission to others. Educated precautions involves staff and visitors wearing PPE, following Standard Precautions and performing meticulous hand hygiene in order to prevent transmission of infection.     Contact Precautions: Educated that Contact Precautions involves staff and visitors wearing gowns and gloves when in the patient room.     In addition, educated that the patient may leave the room, but prior to exiting the patient room each time, the patient needs to have on a fresh patient gown, ensure the potentially infectious area is covered, and perform hand hygiene with soap and water or alcohol-based hand rub, immediately prior to exiting the room.    Enhanced Droplet Precautions: Educated that Enhanced Droplet Precautions involves staff and visitors wearing a surgical mask when in the patient room.     In addition,  educated that they may leave their room, but prior to exiting the patient room each time, the patient needs to have on a fresh patient gown, a surgical mask must be worn by the patient while out of the patient room, and perform hand hygiene immediately prior to exiting the room.       Patient transport and mobilization on unit  Educated that they may leave their room, but prior to exiting, the patient needs to have on a fresh patient gown, ensure the potentially infectious area is covered, performing appropriate hand hygiene immediately prior to exiting the room.

## 2021-06-22 NOTE — CARE PLAN
Respiratory Update    Treatment modality: HFNC 60 L 100% + NRB mask    CPAP 10-12    Frequency:Continues     Pt tolerating current treatments well with no adverse reactions.

## 2021-06-22 NOTE — PROGRESS NOTES
"Critical Care Progress Note    Date of admission  6/16/2021    Chief Complaint  50 y.o. female admitted 6/16/2021 with acute hypoxic respiratory failure secondary to COVID-19 pneumonia    Hospital Course  \"50 y.o. female who presented 6/16/2021 with Hypothyroidism was dx a couple days prior to admission on 6/16 per report today is day 9 of Covid. She was hypoxic needing 6l n/c on admission and she has progressed while here needing maxium support of HFNC + NRB and is desaturating with any movement or talking. I have been asked to consult on patient. She has not had a fever. She feels sick to her stomach, no ana chest pain or pleurisy, leg pain or swelling. HR 70's SBP 's sat 90-94%. Net negative. Receiving dexamethasone and remdesivir. She appoints her  as the point of contact if she is unable too do so. I have told her the concern of being on a ventilator with covid but if she required it I would recommend it with her young age and lack of co morbidities or obesity. CPR is a bit more controversial but overall prognosis is extremely poor.\"      Interval Problem Update  Reviewed last 24 hour events:   - No acute events overnight   - Neuro: AOx4   - HR: 70s-80s   - SBP: 100s-110   - GI: tolerating diet   - UOP: 2.1L/24 hrs   - Ya: yes   - Tm: 37.5   - Lines: PIV   - PPx: GI not indicated, DVT lovenox   - HFNC 60L, 100%   - CXR (personally reviewed and compared to prior): no new    Yesterday   -No acute events overnight   - Neuro: Alert and oriented x4   - HR: SR   - SBP: 90s-110s   - GI: tolerating diet   - UOP: 1.6L/24hrs   - Ya: yes   - Tm: 38.3   - Lines: PIV   - PPx: GI not indicated, DVT lovenox   - HFNC 60L/100%   - CXR (personally reviewed and compared to prior): no new   - Decadron day 6 of 10   - Phos and KCl    Review of Systems  Review of Systems   Constitutional: Positive for fever. Negative for chills.   Eyes: Negative for blurred vision.   Respiratory: Positive for cough and shortness " of breath. Negative for sputum production and stridor.    Cardiovascular: Negative for chest pain.   Gastrointestinal: Negative for abdominal pain, nausea and vomiting.   Genitourinary: Negative for dysuria.   Musculoskeletal: Negative for myalgias.   Skin: Negative for rash.   Neurological: Negative for dizziness, sensory change and focal weakness.   Psychiatric/Behavioral: The patient is nervous/anxious.         Vital Signs for last 24 hours   Pulse:  [70-95] 78  Resp:  [20-37] 32  BP: ()/(36-69) 106/40  SpO2:  [80 %-98 %] 90 %    Hemodynamic parameters for last 24 hours       Respiratory Information for the last 24 hours       Physical Exam   Physical Exam  Vitals and nursing note reviewed.   Constitutional:       Appearance: She is ill-appearing. She is not toxic-appearing.   HENT:      Head: Normocephalic and atraumatic.      Right Ear: External ear normal.      Left Ear: External ear normal.      Nose:      Comments: High flow nasal cannula place     Mouth/Throat:      Pharynx: Oropharynx is clear.   Eyes:      Extraocular Movements: Extraocular movements intact.      Conjunctiva/sclera: Conjunctivae normal.   Cardiovascular:      Rate and Rhythm: Normal rate and regular rhythm.      Pulses: Normal pulses.   Pulmonary:      Breath sounds: Decreased breath sounds and rales present. No wheezing or rhonchi.   Musculoskeletal:         General: Normal range of motion.      Cervical back: Neck supple.      Right lower leg: No edema.      Left lower leg: No edema.   Skin:     General: Skin is warm and dry.      Capillary Refill: Capillary refill takes less than 2 seconds.   Neurological:      General: No focal deficit present.      Mental Status: She is alert.      Cranial Nerves: No cranial nerve deficit.      Sensory: No sensory deficit.      Motor: No weakness.   Psychiatric:         Mood and Affect: Mood is anxious.         Medications  Current Facility-Administered Medications   Medication Dose Route  Frequency Provider Last Rate Last Admin   • melatonin tablet 5 mg  5 mg Oral Nightly Lennox Ramirez M.D.   5 mg at 06/21/21 2025   • furosemide (LASIX) injection 40 mg  40 mg Intravenous BID DIURETIC Maria Victoria Greer M.D.   40 mg at 06/22/21 0527   • guaiFENesin dextromethorphan (ROBITUSSIN DM) 100-10 MG/5ML syrup 5 mL  5 mL Oral Q6HRS PRN Maria Victoria Greer M.D.   5 mL at 06/22/21 0527   • benzonatate (TESSALON) capsule 100 mg  100 mg Oral TID PRN Maria Victoria Greer M.D.   100 mg at 06/21/21 1620   • fluticasone (FLONASE) nasal spray 100 mcg  2 Spray Nasal DAILY Maria Victoria Greer M.D.   100 mcg at 06/21/21 0610   • dexamethasone (DECADRON) tablet 6 mg  6 mg Oral DAILY Maria Victoria Greer M.D.   6 mg at 06/22/21 0528   • acetaminophen (Tylenol) tablet 650 mg  650 mg Oral Q6HRS PRN Maria Victoria Greer M.D.   650 mg at 06/22/21 0302   • ondansetron (ZOFRAN) syringe/vial injection 4 mg  4 mg Intravenous Q6HRS PRN Ankit Samuels M.D.   4 mg at 06/20/21 0903   • ondansetron (ZOFRAN ODT) dispertab 4 mg  4 mg Oral Q6HRS PRN Anikt Samuels M.D.       • enoxaparin (LOVENOX) inj 40 mg  40 mg Subcutaneous DAILY Ankit Samuels M.D.   40 mg at 06/22/21 0528   • vitamin D (cholecalciferol) tablet 1,000 Units  1,000 Units Oral QAM Ankit Samuels M.D.   1,000 Units at 06/22/21 0527   • levothyroxine (SYNTHROID) tablet 125 mcg  125 mcg Oral AM MAKAYLA Samuels M.D.   125 mcg at 06/22/21 0528   • levothyroxine (SYNTHROID) tablet 137 mcg  137 mcg Oral AM MAKAYLA Samuels M.D.   137 mcg at 06/22/21 0527       Fluids    Intake/Output Summary (Last 24 hours) at 6/22/2021 0937  Last data filed at 6/22/2021 0800  Gross per 24 hour   Intake 840 ml   Output 2075 ml   Net -1235 ml       Laboratory          Recent Labs     06/20/21  0800 06/20/21  0800 06/20/21  1255 06/21/21  0640 06/22/21  0540   SODIUM 136   < > 136 136 138   POTASSIUM 2.9*   < > 3.7 3.0* 3.1*   CHLORIDE 93*   < > 93* 95* 93*   CO2 29   < > 31 32 35*   BUN 16   < > 16 17 17   CREATININE 0.71   < > 0.72 0.66  0.63   MAGNESIUM 1.7  --   --  2.4 2.3   PHOSPHORUS  --   --   --  2.3* 3.8   CALCIUM 8.1*   < > 8.0* 8.1* 8.4*    < > = values in this interval not displayed.     Recent Labs     06/20/21  0604 06/20/21  0604 06/20/21  0800 06/20/21  0800 06/20/21  1255 06/21/21  0640 06/22/21  0540   ALTSGPT 28  --  69*  --   --   --  38   ASTSGOT 36  --  73*  --   --   --  36   ALKPHOSPHAT 17*  --  40  --   --   --  43   TBILIRUBIN 0.3  --  0.8  --   --   --  0.6   GLUCOSE 79   < > 164*   < > 221* 150* 151*    < > = values in this interval not displayed.     Recent Labs     06/19/21 1007 06/20/21  0604 06/20/21  0800 06/21/21  0640 06/22/21  0540   WBC 8.6  --   --  7.5 8.7   NEUTSPOLYS 93.00*  --   --  89.50*  --    LYMPHOCYTES 2.60*  --   --  7.90*  --    MONOCYTES 3.50  --   --  1.30  --    EOSINOPHILS 0.00  --   --  0.30  --    BASOPHILS 0.00  --   --  0.10  --    ASTSGOT  --  36 73*  --  36   ALTSGPT  --  28 69*  --  38   ALKPHOSPHAT  --  17* 40  --  43   TBILIRUBIN  --  0.3 0.8  --  0.6     Recent Labs     06/19/21 1007 06/21/21 0640 06/22/21  0540   RBC 5.60* 5.16 5.45*   HEMOGLOBIN 16.7* 15.1 16.3*   HEMATOCRIT 49.4* 45.2 47.6*   PLATELETCT 204 182 182       Imaging  X-Ray:  I have personally reviewed the images and compared with prior images.    Assessment/Plan  * Acute hypoxemic respiratory failure due to COVID-19 (HCC)- (present on admission)  Assessment & Plan  SARS-CoV-2/COVID-19  Recommends labs/Dx:   Avoid unnecessary image study/CT scan and use lung US     Management:  Be extremely mindful of fluids and target euvolemia to net negative fluid balance with early initiation of pressors  Avoid: NSAIDs, bronchoscopy unless absolute necessary for care  Remdesivir for 5 days if requiring high levels of oxygen, avoid in ALT > 5x or GFR < 30  Dexamethasone 6mg x 10days  Monitor for hyperinflamatory phase multiorgan failure, cardiomyopathy, shock, DIC and viral induced HLH   Vte prophylaxis  Strict contact,  droplet/airborne, glasses protection and critical meticulous hand hygiene  She does not meet criteria for Toci per hospital protocol CRP 0.5 I have sent quantiferon and hepatitis panel already  Continue with HFNC + NRB with goal Sat > 85% monitor work of breathing closely  Continue to monitor for need for intubation mechanical ventilation  Evaluated for VTE with echo and lower extremity Doppler: Doppler negative, echo with normal RV  Encouraged self proning    Hyperglycemia- (present on admission)  Assessment & Plan  Secondary to steroids  Monitor for need to initiate sliding scale insulin    Hypokalemia  Assessment & Plan  Aggressive repletion to maintain potassium greater than 4    Hyponatremia  Assessment & Plan  Resolved    Hypothyroidism- (present on admission)  Assessment & Plan  Continue home synthroid       VTE:  Lovenox  Ulcer: Not Indicated  Lines: Ya Catheter  Ongoing indication addressed    I have performed a physical exam and reviewed and updated ROS and Plan today (6/22/2021). In review of yesterday's note (6/21/2021), there are no changes except as documented above.     Titrating HFNC, monitoring for need for Mv.  This patient is critically ill, at high risk for decompensation leading to worsening vital organ dysfunction and death without critical care interventions.    Discussed patient condition and risk of morbidity and/or mortality with RN, RT, Pharmacy, Code status disscussed, Charge nurse / hot rounds and Patient     The patient remains critically ill.  Critical care time = 36 minutes in directly providing and coordinating critical care and extensive data review.  No time overlap and excludes procedures.

## 2021-06-22 NOTE — CARE PLAN
The patient is Watcher - Medium risk of patient condition declining or worsening     Shift Goals  Clinical Goals: Maintain O2 saturation  Patient Goals: sleep comfortably overnight  Family Goals: no family present at this time      Progress made toward(s) clinical / shift goals:    Problem: Psychosocial  Goal: Patient's level of anxiety will decrease  Outcome: Progressing  Goal: Patient's ability to verbalize feelings about condition will improve  Outcome: Progressing     Problem: Respiratory  Goal: Patient will achieve/maintain optimum respiratory ventilation and gas exchange  Outcome: Progressing

## 2021-06-23 NOTE — PROGRESS NOTES
"Critical Care Progress Note    Date of admission  6/16/2021    Chief Complaint  50 y.o. female admitted 6/16/2021 with acute hypoxic respiratory failure secondary to COVID-19 pneumonia    Hospital Course  \"50 y.o. female who presented 6/16/2021 with Hypothyroidism was dx a couple days prior to admission on 6/16 per report today is day 9 of Covid. She was hypoxic needing 6l n/c on admission and she has progressed while here needing maxium support of HFNC + NRB and is desaturating with any movement or talking. I have been asked to consult on patient. She has not had a fever. She feels sick to her stomach, no ana chest pain or pleurisy, leg pain or swelling. HR 70's SBP 's sat 90-94%. Net negative. Receiving dexamethasone and remdesivir. She appoints her  as the point of contact if she is unable too do so. I have told her the concern of being on a ventilator with covid but if she required it I would recommend it with her young age and lack of co morbidities or obesity. CPR is a bit more controversial but overall prognosis is extremely poor.\"      Interval Problem Update  Reviewed last 24 hour events:   - No acute events overnight   - Neuro: AOx4   - HR: 60s-90s   - SBP: 90s-110s   - GI: tolerating diet   - UOP: 1.9L/24 hrs   - Ya: yes   - Tm: 37.7   - Lines: PIV   - PPx: GI not indicated, DVT lovenox   - HFNC   - CXR (personally reviewed and compared to prior): no new    Yesterday   - No acute events overnight   - Neuro: AOx4   - HR: 70s-80s   - SBP: 100s-110   - GI: tolerating diet   - UOP: 2.1L/24 hrs   - Ya: yes   - Tm: 37.5   - Lines: PIV   - PPx: GI not indicated, DVT lovenox   - HFNC 60L, 100%   - CXR (personally reviewed and compared to prior): no new    Review of Systems  Review of Systems   Constitutional: Positive for fever. Negative for chills.   Eyes: Negative for blurred vision.   Respiratory: Positive for cough and shortness of breath. Negative for sputum production and stridor.  "   Cardiovascular: Negative for chest pain.   Gastrointestinal: Negative for abdominal pain, nausea and vomiting.   Genitourinary: Negative for dysuria.   Musculoskeletal: Negative for myalgias.   Skin: Negative for rash.   Neurological: Negative for dizziness, sensory change and focal weakness.   Psychiatric/Behavioral: The patient is nervous/anxious.         Vital Signs for last 24 hours   Pulse:  [64-98] 97  Resp:  [19-34] 31  BP: ()/(32-59) 116/52  SpO2:  [78 %-96 %] 93 %    Hemodynamic parameters for last 24 hours       Respiratory Information for the last 24 hours       Physical Exam   Physical Exam  Vitals and nursing note reviewed.   Constitutional:       Appearance: She is ill-appearing. She is not toxic-appearing.   HENT:      Head: Normocephalic and atraumatic.      Right Ear: External ear normal.      Left Ear: External ear normal.      Nose:      Comments: High flow nasal cannula place     Mouth/Throat:      Pharynx: Oropharynx is clear.   Eyes:      Extraocular Movements: Extraocular movements intact.      Conjunctiva/sclera: Conjunctivae normal.   Cardiovascular:      Rate and Rhythm: Normal rate and regular rhythm.      Pulses: Normal pulses.   Pulmonary:      Breath sounds: Decreased breath sounds and rales present. No wheezing or rhonchi.   Musculoskeletal:         General: Normal range of motion.      Cervical back: Neck supple.      Right lower leg: No edema.      Left lower leg: No edema.   Skin:     General: Skin is warm and dry.      Capillary Refill: Capillary refill takes less than 2 seconds.   Neurological:      General: No focal deficit present.      Mental Status: She is alert.      Cranial Nerves: No cranial nerve deficit.      Sensory: No sensory deficit.      Motor: No weakness.   Psychiatric:         Mood and Affect: Mood is anxious.         Medications  Current Facility-Administered Medications   Medication Dose Route Frequency Provider Last Rate Last Admin   • MD Alert...ICU  Electrolyte Replacement per Pharmacy   Other PHARMACY TO DOSE Luis Miguel Bower Jr. D.O.       • sodium chloride (OCEAN) 0.65 % nasal spray 2 Spray  2 Spray Nasal Q2HRS PRN Luis Miguel Bower Jr. D.OKd   2 [place] at 06/22/21 1741   • loperamide (IMODIUM) capsule 2 mg  2 mg Oral 4X/DAY PRN Luis Miguel Bower Jr. D.O.   2 mg at 06/23/21 0523   • fluticasone (FLONASE) nasal spray 100 mcg  2 Spray Nasal DAILY Luis Miguel Bower Jr. D.O.   100 mcg at 06/22/21 1328   • melatonin tablet 5 mg  5 mg Oral Nightly Lennox Ramirez M.D.   5 mg at 06/22/21 2041   • furosemide (LASIX) injection 40 mg  40 mg Intravenous BID DIURETIC Maria Victoria Greer M.D.   40 mg at 06/23/21 0522   • guaiFENesin dextromethorphan (ROBITUSSIN DM) 100-10 MG/5ML syrup 5 mL  5 mL Oral Q6HRS PRN Maria Victoria Greer M.D.   5 mL at 06/23/21 0255   • benzonatate (TESSALON) capsule 100 mg  100 mg Oral TID PRN Maria Victoria Greer M.D.   100 mg at 06/21/21 1620   • dexamethasone (DECADRON) tablet 6 mg  6 mg Oral DAILY Maria Victoria Greer M.D.   6 mg at 06/23/21 0523   • acetaminophen (Tylenol) tablet 650 mg  650 mg Oral Q6HRS PRN Maria Victoria Greer M.D.   650 mg at 06/22/21 2304   • ondansetron (ZOFRAN) syringe/vial injection 4 mg  4 mg Intravenous Q6HRS PRN Ankit Samuels M.D.   4 mg at 06/20/21 0903   • ondansetron (ZOFRAN ODT) dispertab 4 mg  4 mg Oral Q6HRS PRN Ankit Samuels M.D.       • enoxaparin (LOVENOX) inj 40 mg  40 mg Subcutaneous DAILY Ankit Samuels M.D.   40 mg at 06/23/21 0523   • vitamin D (cholecalciferol) tablet 1,000 Units  1,000 Units Oral OYGI Samuels M.D.   1,000 Units at 06/23/21 0523   • levothyroxine (SYNTHROID) tablet 125 mcg  125 mcg Oral AM MAKAYLA Samuels M.D.   125 mcg at 06/23/21 0523   • levothyroxine (SYNTHROID) tablet 137 mcg  137 mcg Oral AM MAKAYLA Samuels M.D.   137 mcg at 06/23/21 0522       Fluids    Intake/Output Summary (Last 24 hours) at 6/23/2021 0805  Last data filed at 6/23/2021 0600  Gross per 24 hour   Intake 960 ml   Output 1850 ml   Net -890  ml       Laboratory          Recent Labs     06/21/21 0640 06/22/21  0540 06/23/21  0530   SODIUM 136 138 135   POTASSIUM 3.0* 3.1* 3.9   CHLORIDE 95* 93* 94*   CO2 32 35* 33   BUN 17 17 17   CREATININE 0.66 0.63 0.55   MAGNESIUM 2.4 2.3  --    PHOSPHORUS 2.3* 3.8  --    CALCIUM 8.1* 8.4* 8.7     Recent Labs     06/21/21 0640 06/22/21 0540 06/23/21  0530   ALTSGPT  --  38  --    ASTSGOT  --  36  --    ALKPHOSPHAT  --  43  --    TBILIRUBIN  --  0.6  --    GLUCOSE 150* 151* 150*     Recent Labs     06/21/21 0640 06/22/21 0540 06/23/21  0530   WBC 7.5 8.7 12.9*   NEUTSPOLYS 89.50*  --  93.50*   LYMPHOCYTES 7.90*  --  3.30*   MONOCYTES 1.30  --  2.00   EOSINOPHILS 0.30  --  0.20   BASOPHILS 0.10  --  0.20   ASTSGOT  --  36  --    ALTSGPT  --  38  --    ALKPHOSPHAT  --  43  --    TBILIRUBIN  --  0.6  --      Recent Labs     06/21/21 0640 06/22/21 0540 06/23/21  0530   RBC 5.16 5.45* 5.43*   HEMOGLOBIN 15.1 16.3* 16.0   HEMATOCRIT 45.2 47.6* 47.6*   PLATELETCT 182 182 136*       Imaging  X-Ray:  I have personally reviewed the images and compared with prior images.    Assessment/Plan  * Acute hypoxemic respiratory failure due to COVID-19 (HCC)- (present on admission)  Assessment & Plan  SARS-CoV-2/COVID-19  Recommends labs/Dx:   Avoid unnecessary image study/CT scan and use lung US     Management:  Be extremely mindful of fluids and target euvolemia to net negative fluid balance with early initiation of pressors  Avoid: NSAIDs, bronchoscopy unless absolute necessary for care  Remdesivir for 5 days if requiring high levels of oxygen, avoid in ALT > 5x or GFR < 30  Dexamethasone 6mg x 10days  Monitor for hyperinflamatory phase multiorgan failure, cardiomyopathy, shock, DIC and viral induced HLH   VTE prophylaxis  Strict contact, droplet/airborne, glasses protection and critical meticulous hand hygiene  She does not meet criteria for Toci per hospital protocol CRP 0.5 I have sent quantiferon and hepatitis panel  already  Continue with HFNC + NRB with goal Sat > 85% monitor work of breathing closely  Continue to monitor for need for intubation mechanical ventilation  Evaluated for VTE with echo and lower extremity Doppler: Doppler negative, echo with normal RV  Encouraged self proning    Hyperglycemia- (present on admission)  Assessment & Plan  Secondary to steroids  Monitor for need to initiate sliding scale insulin    Hypokalemia  Assessment & Plan  Aggressive repletion to maintain potassium greater than 4    Hyponatremia- (present on admission)  Assessment & Plan  Resolved    Hypothyroidism- (present on admission)  Assessment & Plan  Continue home synthroid       VTE:  Lovenox  Ulcer: Not Indicated  Lines: Ya Catheter  Ongoing indication addressed    I have performed a physical exam and reviewed and updated ROS and Plan today (6/23/2021). In review of yesterday's note (6/22/2021), there are no changes except as documented above.     Titrating HFNC, at high risk of failure.  This patient is critically ill, at high risk for decompensation leading to worsening vital organ dysfunction and death without critical care interventions.    Discussed patient condition and risk of morbidity and/or mortality with RN, RT, Pharmacy, Code status disscussed, Charge nurse / hot rounds and Patient     The patient remains critically ill.  Critical care time = 34 minutes in directly providing and coordinating critical care and extensive data review.  No time overlap and excludes procedures.

## 2021-06-23 NOTE — CARE PLAN
The patient is Watcher - Medium risk of patient condition declining or worsening        Shift Goals  Clinical Goals: Maintain O2 saturation  Patient Goals: sleep comfortably overnight  Family Goals: no family present at this time     Progress made toward(s) clinical / shift goals:    Problem: Psychosocial  Goal: Patient's level of anxiety will decrease  Outcome: Progressing     Problem: Respiratory  Goal: Patient will achieve/maintain optimum respiratory ventilation and gas exchange  Outcome: Progressing

## 2021-06-23 NOTE — CARE PLAN
The patient is Watcher - Medium risk of patient condition declining or worsening         Progress made toward(s) clinical / shift goals:  .    Patient is not progressing towards the following goals:      Problem: Psychosocial  Goal: Patient's level of anxiety will decrease  Outcome: Not Met  Goal: Patient's ability to verbalize feelings about condition will improve  Outcome: Not Met  Goal: Patient's ability to re-evaluate and adapt role responsibilities will improve  Outcome: Not Met  Goal: Patient and family will demonstrate ability to cope with life altering diagnosis and/or procedure  Outcome: Not Met  Goal: Spiritual and cultural needs incorporated into hospitalization  Outcome: Not Met     Problem: Discharge Barriers/Planning  Goal: Patient's continuum of care needs are met  Outcome: Not Met     Problem: Respiratory  Goal: Patient will achieve/maintain optimum respiratory ventilation and gas exchange  Outcome: Not Met

## 2021-06-23 NOTE — CARE PLAN
Problem: Humidified High Flow Nasal Cannula  Goal: Maintain adequate oxygenation dependent on patient condition  Description: 1.  Implement humidified high flow oxygen therapy  2.  Titrate high flow oxygen to maintain appropriate SpO2  Outcome: Progressing       Respiratory Update    Treatment modality: HHFNC   Frequency: Q4    60L, 100%    Pt tolerating current treatments well with no adverse reactions.

## 2021-06-24 NOTE — PROGRESS NOTES
Pt expressed that the trazodone helped her sleep last night but that she would like something to help with her anxiety throughout the day. She feels that her anxiety is limiting her progression. I let the pt know that I will make sure they address this today to see if she can get something PRN.

## 2021-06-24 NOTE — PROGRESS NOTES
"Critical Care Progress Note    Date of admission  6/16/2021    Chief Complaint  50 y.o. female admitted 6/16/2021 with acute hypoxic respiratory failure secondary to COVID-19 pneumonia    Hospital Course  \"50 y.o. female who presented 6/16/2021 with Hypothyroidism was dx a couple days prior to admission on 6/16 per report today is day 9 of Covid. She was hypoxic needing 6l n/c on admission and she has progressed while here needing maxium support of HFNC + NRB and is desaturating with any movement or talking. I have been asked to consult on patient. She has not had a fever. She feels sick to her stomach, no ana chest pain or pleurisy, leg pain or swelling. HR 70's SBP 's sat 90-94%. Net negative. Receiving dexamethasone and remdesivir. She appoints her  as the point of contact if she is unable too do so. I have told her the concern of being on a ventilator with covid but if she required it I would recommend it with her young age and lack of co morbidities or obesity. CPR is a bit more controversial but overall prognosis is extremely poor.\"      Interval Problem Update  Reviewed last 24 hour events:   - slept well   - Neuro: anxious, AOx4   - HR: 100s-110s   - SBP: 100s-110s   - GI: tolerating diet, constipated   - UOP: 1.2L/24 hrs   - Ya: yes   - Tm: 38.8   - Lines: PIV   - PPx: GI not indicated, DVT lovenox   - HFNC 50L/90%   - CXR (personally reviewed and compared to prior): no new   - Day 9 of 10 of dex    Updates: WOB worsened this afternoon. CXR and PCT ordered. She will be proned and if this fails to improve SpO2 then she may require intubation    Yesterday   - No acute events overnight   - Neuro: AOx4   - HR: 70s-80s   - SBP: 100s-110   - GI: tolerating diet   - UOP: 2.1L/24 hrs   - Ya: yes   - Tm: 37.5   - Lines: PIV   - PPx: GI not indicated, DVT lovenox   - HFNC 60L, 100%   - CXR (personally reviewed and compared to prior): no new    Review of Systems  Review of Systems "   Constitutional: Positive for fever. Negative for chills.   Eyes: Negative for blurred vision.   Respiratory: Positive for cough and shortness of breath. Negative for sputum production and stridor.    Cardiovascular: Negative for chest pain.   Gastrointestinal: Negative for abdominal pain, nausea and vomiting.   Genitourinary: Negative for dysuria.   Musculoskeletal: Negative for myalgias.   Skin: Negative for rash.   Neurological: Negative for dizziness, sensory change and focal weakness.   Psychiatric/Behavioral: The patient is nervous/anxious.         Vital Signs for last 24 hours   Temp:  [37.1 °C (98.8 °F)-38.8 °C (101.8 °F)] 37.1 °C (98.8 °F)  Pulse:  [] 101  Resp:  [19-44] 25  BP: ()/(35-89) 113/41  SpO2:  [80 %-96 %] 94 %    Hemodynamic parameters for last 24 hours       Respiratory Information for the last 24 hours       Physical Exam   Physical Exam  Vitals and nursing note reviewed.   Constitutional:       Appearance: She is ill-appearing. She is not toxic-appearing.   HENT:      Head: Normocephalic and atraumatic.      Right Ear: External ear normal.      Left Ear: External ear normal.      Nose:      Comments: High flow nasal cannula place     Mouth/Throat:      Pharynx: Oropharynx is clear.   Eyes:      Extraocular Movements: Extraocular movements intact.      Conjunctiva/sclera: Conjunctivae normal.   Cardiovascular:      Rate and Rhythm: Normal rate and regular rhythm.      Pulses: Normal pulses.   Pulmonary:      Breath sounds: Decreased breath sounds and rales present. No wheezing or rhonchi.      Comments: Slightly increased WOB  Musculoskeletal:         General: Normal range of motion.      Cervical back: Neck supple.      Right lower leg: No edema.      Left lower leg: No edema.   Skin:     General: Skin is warm and dry.      Capillary Refill: Capillary refill takes less than 2 seconds.   Neurological:      General: No focal deficit present.      Mental Status: She is alert.       Cranial Nerves: No cranial nerve deficit.      Sensory: No sensory deficit.      Motor: No weakness.   Psychiatric:         Mood and Affect: Mood is anxious.         Medications  Current Facility-Administered Medications   Medication Dose Route Frequency Provider Last Rate Last Admin   • ALPRAZolam (XANAX) tablet 0.25 mg  0.25 mg Oral TID PRN Luis Miguel Bower Jr. D.O.       • traZODone (DESYREL) tablet 50 mg  50 mg Oral QHS PRN Luis Miguel Bower Jr. D.O.   50 mg at 06/23/21 2230   • MD Alert...ICU Electrolyte Replacement per Pharmacy   Other PHARMACY TO DOSE Luis Miguel Bower Jr. D.O.       • sodium chloride (OCEAN) 0.65 % nasal spray 2 Spray  2 Spray Nasal Q2HRS PRN Luis Miguel Bower Jr. D.O.   2 [place] at 06/22/21 1741   • loperamide (IMODIUM) capsule 2 mg  2 mg Oral 4X/DAY PRN Luis Miguel Bower Jr. D.O.   2 mg at 06/23/21 2150   • fluticasone (FLONASE) nasal spray 100 mcg  2 Spray Nasal DAILY Luis Miguel Bower Jr., D.O.   100 mcg at 06/22/21 1328   • melatonin tablet 5 mg  5 mg Oral Nightly Lennox Ramirez M.D.   5 mg at 06/23/21 2130   • furosemide (LASIX) injection 40 mg  40 mg Intravenous BID DIURETIC Maria Victoria Greer M.D.   40 mg at 06/24/21 0543   • guaiFENesin dextromethorphan (ROBITUSSIN DM) 100-10 MG/5ML syrup 5 mL  5 mL Oral Q6HRS PRN Maria Victoria Greer M.D.   5 mL at 06/24/21 0543   • benzonatate (TESSALON) capsule 100 mg  100 mg Oral TID PRN Maria Victoria Greer M.D.   100 mg at 06/23/21 1925   • dexamethasone (DECADRON) tablet 6 mg  6 mg Oral DAILY Maria Victoria Greer M.D.   6 mg at 06/24/21 0543   • acetaminophen (Tylenol) tablet 650 mg  650 mg Oral Q6HRS PRN Maria Victoria Greer M.D.   650 mg at 06/24/21 0543   • ondansetron (ZOFRAN) syringe/vial injection 4 mg  4 mg Intravenous Q6HRS PRN Ankit Samuels M.D.   4 mg at 06/20/21 0903   • ondansetron (ZOFRAN ODT) dispertab 4 mg  4 mg Oral Q6HRS PRN Ankit Samuels M.D.       • enoxaparin (LOVENOX) inj 40 mg  40 mg Subcutaneous DAILY Ankit Samuels M.D.   40 mg at 06/24/21 0543   • vitamin D  (cholecalciferol) tablet 1,000 Units  1,000 Units Oral YOGI Samuels M.D.   1,000 Units at 06/24/21 0543   • levothyroxine (SYNTHROID) tablet 125 mcg  125 mcg Oral AM MAKAYLA Samuels M.D.   125 mcg at 06/24/21 0543   • levothyroxine (SYNTHROID) tablet 137 mcg  137 mcg Oral AM MAKAYLA Samuels M.D.   137 mcg at 06/24/21 0543       Fluids    Intake/Output Summary (Last 24 hours) at 6/24/2021 1044  Last data filed at 6/24/2021 1000  Gross per 24 hour   Intake 300 ml   Output 1300 ml   Net -1000 ml       Laboratory          Recent Labs     06/22/21  0540 06/23/21  0530 06/24/21  0555   SODIUM 138 135 134*   POTASSIUM 3.1* 3.9 4.5   CHLORIDE 93* 94* 93*   CO2 35* 33 30   BUN 17 17 22   CREATININE 0.63 0.55 0.57   MAGNESIUM 2.3  --   --    PHOSPHORUS 3.8  --   --    CALCIUM 8.4* 8.7 9.0     Recent Labs     06/22/21  0540 06/23/21  0530 06/24/21  0555   ALTSGPT 38  --   --    ASTSGOT 36  --   --    ALKPHOSPHAT 43  --   --    TBILIRUBIN 0.6  --   --    GLUCOSE 151* 150* 174*     Recent Labs     06/22/21 0540 06/23/21  0530 06/24/21  0555   WBC 8.7 12.9* 16.4*   NEUTSPOLYS  --  93.50* 92.60*   LYMPHOCYTES  --  3.30* 4.40*   MONOCYTES  --  2.00 1.50   EOSINOPHILS  --  0.20 0.20   BASOPHILS  --  0.20 0.30   ASTSGOT 36  --   --    ALTSGPT 38  --   --    ALKPHOSPHAT 43  --   --    TBILIRUBIN 0.6  --   --      Recent Labs     06/22/21  0540 06/23/21  0530 06/24/21  0555   RBC 5.45* 5.43* 5.48*   HEMOGLOBIN 16.3* 16.0 16.4*   HEMATOCRIT 47.6* 47.6* 47.0   PLATELETCT 182 136* 81*       Imaging  X-Ray:  I have personally reviewed the images and compared with prior images.    Assessment/Plan  * Acute hypoxemic respiratory failure due to COVID-19 (HCC)- (present on admission)  Assessment & Plan  SARS-CoV-2/COVID-19  Target euvolemia to net negative fluid balance with early initiation of pressors  Avoid NSAIDs, bronchoscopy unless absolute necessary for care  Dexamethasone 6mg x 10days  Monitor for hyperinflamatory phase  multiorgan failure, cardiomyopathy, shock, DIC and viral induced HLH   VTE prophylaxis  Strict contact, droplet/airborne, glasses protection and critical meticulous hand hygiene  She does not meet criteria for Toci per hospital protocol CRP 0.5 I have sent quantiferon and hepatitis panel already  Continue with HFNC + NRB with goal Sat > 85% monitor work of breathing closely  Continue to monitor for need for intubation mechanical ventilation  Evaluated for VTE with echo and lower extremity Doppler: Doppler negative, echo with normal RV  Encouraged self proning  Check chest XR and PCT    Hyperglycemia- (present on admission)  Assessment & Plan  Secondary to steroids  Monitor for need to initiate sliding scale insulin    Hypokalemia  Assessment & Plan  Aggressive repletion to maintain potassium greater than 4    Hyponatremia- (present on admission)  Assessment & Plan  Resolved    Hypothyroidism- (present on admission)  Assessment & Plan  Continue home synthroid       VTE:  Lovenox  Ulcer: Not Indicated  Lines: Ya Catheter  Ongoing indication addressed    I have performed a physical exam and reviewed and updated ROS and Plan today (6/24/2021). In review of yesterday's note (6/23/2021), there are no changes except as documented above.     Titrating HFNC, at high risk of RF leading to need for MV  This patient is critically ill, at high risk for decompensation leading to worsening vital organ dysfunction and death without critical care interventions.    Discussed patient condition and risk of morbidity and/or mortality with RN, RT, Pharmacy, Code status disscussed, Charge nurse / hot rounds and Patient     The patient remains critically ill.  Critical care time = 54 minutes in directly providing and coordinating critical care and extensive data review.  No time overlap and excludes procedures.

## 2021-06-24 NOTE — CARE PLAN
The patient is Watcher - Medium risk of patient condition declining or worsening         Progress made toward(s) clinical / shift goals:    Problem: Psychosocial  Goal: Patient's level of anxiety will decrease  Outcome: Progressing     Problem: Gastrointestinal Irritability  Goal: Diarrhea will be absent or improved  Outcome: Progressing       Frequently rounding, comforting pt and listening to her concerns. Plan to give trazadone tonight to try and see if it helps her sleep d/t insomnia. Pt has been receiving PRN imodium for frequent diarrhea, appears to be improving.

## 2021-06-24 NOTE — PROGRESS NOTES
[id number]: Pt appeared upset, she was depressed about her day today and wishing she had more progression and assistance. Sat with the pt for awhile to try and help her calm down. We talked about our plan for the night, I told her we will get her some tylenol to help with the fever, prn coughing meds as she can have them, and try to take the new prn insomnia medication to see if it would help. Pt appeared more at ease and is relaxing in the bed at this time.

## 2021-06-25 PROBLEM — R57.9 SHOCK (HCC): Status: ACTIVE | Noted: 2021-01-01

## 2021-06-25 PROBLEM — D65 DIC (DISSEMINATED INTRAVASCULAR COAGULATION) (HCC): Status: ACTIVE | Noted: 2021-01-01

## 2021-06-25 NOTE — PROGRESS NOTES
Dr. Locke at bedside. EKOS infusions stopped. 2 amps bicarb given. Orders for 4 FFP, 2 RBC, and Ig CaCl

## 2021-06-25 NOTE — OR SURGEON
Immediate Post- Operative Note        PostOp Diagnosis: Massive B PE, Covid 19.      Procedure(s): Bilateral pulmonary artery angiogram and EKOS catheter placement for thrombolysis.       Estimated Blood Loss: Less than 5 ml        Complications: None            6/24/2021     1246 PM     Parmjit Sandra M.D.

## 2021-06-25 NOTE — PROGRESS NOTES
Interval Critical Care Progress Note:    Pt proned.  Oxygen saturations declined after proning and pt became hypotensive with bradycardia.  Pt placed back supine.  Pt's face and neck were cyanotic.    After pt placed back in supine position pt had continued desaturations, hypotension, and bradycardia.  Pt went into PEA arrest and CPR was initiated.    CPR was performed according to ACLS protocols.  Pt received 1 mg of epinephrine and CPR for 2 min.  A pulse check was performed and pt had return of pulse.  An epi gtt was started.    Pt's oxygen saturations remained below 40%.  An ABG was checked and pt's pO2 was 40, pCO2 was 80, and pH was 6.9.    Etiology of pt's PEA arrest was unknown but pt's ABG, hypotension, and hypoxia suggested possible PE.  Pt was too unstable for CTA Chest PE protocol, so a bedside echocardiogram was performed and read by Dr. Ayaka Rice.  Dr. Rice stated the RV was severely dilated with poor function and the pt's PA pressures were 32 mmHg.  Dr. Rice was concern pt may have pulmonary artery obstruction causing pt's poor RV function and elevated PA pressures.     TPA for massive PE was administered and IR, Dr. Sandra, was consulted for EKOS/Pulmonary Artery thrombectomy.  Pt started on Flolan for pulmonary HTN.      Pt had EKOS catheters placed in IR, by Dr. Sandra.  Pt will have EKOS for 4 hrs and then catheters can be pulled.  After EKOS catheters pulled pt can be proned.      Pt would benefit from ECMO consideration.  I called [facility] Transfer center at 0000 hrs to initiate ECMO transfer process, but no one answered.      Pt continues on vasopressin, levophed, and epinephrine.  Pt given stress dose steroids.    Systemic heparin gtt stopped because EKOS was running heparin into PA's.      Will repeat CBC, BMP, and lactic acid.

## 2021-06-25 NOTE — DISCHARGE PLANNING
Medical Social Work     SW received a call from the RN requesting SW to bedside for family. SW met with the pt and family at bedside and provided emotional support. SW offered yue services, the family politely declined. SW provided the pts family with water.     Plan: SW will remain available for pt and family support.

## 2021-06-25 NOTE — PROGRESS NOTES
Interval Critical Care Progress Note:    Pt desaturating into the 70% on HFNC.  Pt working to breath with respirations at 38 breath/min.      Pt positioned on right side without improvement in oxygen saturations.  Pt unable to prone as pt too anxious.     Pt attempted bipap but her saturations were still in the 70's with bipap.      After a discussion with the patient and her  the decision was made to intubate the pt.  The , Dov, and pt agreed to intubation, central line placement, and arterial line placement.      Pt was intubated, please see intubation note.  Pt had R subclavian line placed which was found to be in pt's right IJ and not SVC, so the right subclavian line was removed and a right femoral line was placed.  Please see procedure note for details.    Pt had right radial arterial line placed.  Please see procedure note for right radial artery line.  Unable to advance wire in right radial artery so right radial artery was abandoned and a right femoral arterial line was placed.  Please see procedure note for details.      After intubation and line placement pt was proned.  Pt was paralyzed with vecuronium gtt.  Pt has propofol and fentanyl for sedation.  Pt has levophed and vasopressin for vasopressors.  Pt was pancultured as pt stopped abx tx and her WBC is increasing. Awaiting results of pan-culture to start abx tx.    Pt's lovenox changed from prophylactic to therapeutic dosage.      Cont steroids, await cultures to guide abx tx, Prone for 16 hrs, supine for 8 hrs.    AGB prn for ventilator changes and labs.  ARDS-Net protocol.

## 2021-06-25 NOTE — PROCEDURES
Central Line Insertion    Date/Time: 6/24/2021 8:15 PM  Performed by: Amadeo Neely D.O.  Authorized by: Amadeo Neely D.O.     Consent:     Consent obtained:  Verbal    Consent given by:  Patient and spouse    Risks discussed:  Infection, pneumothorax and bleeding    Alternatives discussed:  No treatment  Pre-procedure details:     Hand hygiene: Hand hygiene performed prior to insertion      Sterile barrier technique: All elements of maximal sterile technique followed      Skin preparation:  2% chlorhexidine    Skin preparation agent: Skin preparation agent completely dried prior to procedure    Sedation:     Sedation type:  Deep  Anesthesia:     Anesthesia method:  None  Procedure details:     Location:  R femoral    Patient position:  Flat    Procedural supplies:  Triple lumen    Catheter size:  7 Fr    Landmarks identified: yes      Ultrasound guidance: no      Number of attempts:  1    Successful placement: yes    Post-procedure details:     Post-procedure:  Dressing applied and line sutured    Assessment:  Blood return through all ports and free fluid flow    Patient tolerance of procedure:  Tolerated well, no immediate complications

## 2021-06-25 NOTE — RESPIRATORY CARE
I responded to the code blue.  Pt. Being handbag by nursing and I took over for the nurse.  Pt. Pulse came back and placed her back on CMV previous settings.   Gave orders to increased RR to 30. VT to 450 cc post ABG results.

## 2021-06-25 NOTE — PROCEDURES
Intubation    Date/Time: 6/24/2021 8:12 PM  Performed by: Amadeo Neely D.O.  Authorized by: Amadeo Neely D.O.     Consent:     Consent obtained:  Verbal    Consent given by:  Spouse and patient    Risks discussed:  Hypoxia and death    Alternatives discussed:  No treatment  Pre-procedure details:     Patient status:  Awake    Paralytics:  Rocuronium  Procedure details:     Preoxygenation:  BiPAP    CPR in progress: no      Intubation method:  Oral    Oral intubation technique:  Video-assisted    Laryngoscope type:  GlideScope    Laryngoscope blade:  Mac 3    Tube size (mm):  7.5    Tube type:  Cuffed    Number of attempts:  1    Ventilation between attempts: no      Cricoid pressure: no      Tube visualized through cords: yes    Placement assessment:     ETT to teeth:  23 cm    Tube secured with:  ETT coates    Breath sounds:  Equal and absent over the epigastrium    Placement verification: chest rise, CXR verification, equal breath sounds and ETCO2 detector      CXR findings:  ETT in proper place  Post-procedure details:     Patient tolerance of procedure:  Tolerated well, no immediate complications

## 2021-06-25 NOTE — PROGRESS NOTES
Radiology Progress Note   Author: CHRIS Vaughn Date & Time created: 6/25/2021  10:58 AM   Date of admission  6/16/2021  Note to reader: this note follows the APSO format rather than the historical SOAP format. Assessment and plan located at the top of the note for ease of use.    Chief Complaint  50 y.o. female admitted 6/16/2021 with acute hypoxic respiratory failure secondary to COVID-19 pneumonia    HPI  50 y.o. female who presented 6/16/2021 with Hypothyroidism was dx a couple days prior to admission on 6/16 per report today is day 9 of Covid. She was hypoxic needing 6l n/c on admission and she has progressed while here needing maxium support of HFNC + NRB and is desaturating with any movement or talking. I have been asked to consult on patient. She has not had a fever. She feels sick to her stomach, no ana chest pain or pleurisy, leg pain or swelling. HR 70's SBP 's sat 90-94%. Net negative. Receiving dexamethasone and remdesivir. She appoints her  as the point of contact if she is unable too do so. I have told her the concern of being on a ventilator with covid but if she required it I would recommend it with her young age and lack of co morbidities or obesity. CPR is a bit more controversial but overall prognosis is extremely poor    Assessment/Plan  Interval History   Principal Problem:    Acute hypoxemic respiratory failure due to COVID-19 (HCC)  Active Problems:    Hypothyroidism    Hyponatremia    Hypokalemia    Hyperglycemia    Plan IR  -Right groin site site access site, CDI, dressing over site   - Thank you for allowing Interventional Radiology team to participate in the patients care, if any additonal care or requests are needed in the future please do not hesitate call or place IR order        IR:   6/24- Bilateral pulmonary artery angiogram and EKOS catheter placement for thrombolysis.   6/25- removed EKOs catheter asap as there was no large visualized clot and harm           Review of Systems  Physical Exam   Review of Systems   Unable to perform ROS: Intubated      Vitals:    06/25/21 1032   BP: 100/60   Pulse: (!) 132   Resp: (!) 34   Temp: 37 °C (98.6 °F)   SpO2: (!) 82%      Physical Exam  Nursing note reviewed.   Constitutional:       Appearance: She is ill-appearing.      Interventions: She is intubated.   HENT:      Head: Normocephalic.      Mouth/Throat:      Mouth: Mucous membranes are moist.   Cardiovascular:      Rate and Rhythm: Tachycardia present.   Pulmonary:      Effort: Tachypnea present. She is intubated.   Abdominal:      General: Abdomen is flat.   Musculoskeletal:      Cervical back: Normal range of motion.      Right lower leg: No edema.      Left lower leg: No edema.   Skin:     General: Skin is warm and dry.      Capillary Refill: Capillary refill takes less than 2 seconds.             Labs    Recent Labs     06/25/21  0159 06/25/21  0451 06/25/21  0815   WBC 51.1* 44.0* 28.5*   RBC 3.03* 2.08* 1.79*   HEMOGLOBIN 9.4* 6.5* 5.6*   HEMATOCRIT 31.0* 21.7* 18.3*   .3* 104.3* 102.2*   MCH 31.0 31.3 31.3   MCHC 30.3* 30.0* 30.6*   RDW 45.3 46.9 52.8*   PLATELETCT 104* 88* 44*   MPV 11.3 11.3 12.0     Recent Labs     06/25/21  0159 06/25/21  0513 06/25/21  0815   SODIUM 134* 149* 153*   POTASSIUM 5.7* 4.7 4.8   CHLORIDE 89* 94* 94*   CO2 10* 9* 12*   GLUCOSE 568* 365* 316*   BUN 23* 28* 27*   CREATININE 1.90* 2.09* 1.93*   CALCIUM 6.9* 8.8 10.1     DX-CHEST-PORTABLE (1 VIEW)   Final Result      1.  Endotracheal tube tip projects approximately 5.7 cm above the alan      2.  Bilateral airspace opacification is somewhat more pronounced in the upper lobes.      EC-ECHOCARDIOGRAM LTD W/O CONT   Final Result      DX-CHEST-FOR LINE PLACEMENT Perform procedure in: Patient's Room   Final Result      1.  Malpositioned right-sided central venous catheter. Recommend repositioning/placement.   2.  Satisfactory endotracheal tube.      Multiple unsuccessful attempts to  communicate by phone with JOHNNIE STAPLES, the patient's nurse or charge nurse. Findings were discussed and communicated back by clerk Hills On 6/24/2021 8:17 PM.      DX-ABDOMEN FOR TUBE PLACEMENT   Final Result      1.  NG tube tip projects over the gastric body.   2.  Lungs demonstrate bilateral parenchymal opacities consistent with Covid pneumonia.      DX-CHEST-PORTABLE (1 VIEW)   Final Result      Bilateral pulmonary airspace process consistent with Covid pneumonia with slight radiographic worsening      DX-CHEST-PORTABLE (1 VIEW)   Final Result      Moderate worsening multifocal consolidation with slight improvement at the left base that may be from atelectasis or removal of prior extrinsic material obscuring detail      EC-ECHOCARDIOGRAM LTD W/O CONT   Final Result      US-EXTREMITY VENOUS LOWER BILAT   Final Result      DX-CHEST-LIMITED (1 VIEW)   Final Result         1.  Pulmonary infiltrates, new since prior study.      CT-CTA CHEST PULMONARY ARTERY W/ RECONS   Final Result      1.  Bilateral groundglass opacities are most pronounced in the periphery of the lungs. Findings are consistent with Covid pneumonia            DX-CHEST-PORTABLE (1 VIEW)   Final Result      No acute cardiac or pulmonary abnormalities are identified.      IR-PULMONARY ANGIOGRAM-BILATERAL    (Results Pending)   DX-CHEST-FOR LINE PLACEMENT Perform procedure in: Patient's Room    (Results Pending)     Recent Labs     06/25/21  0159 06/25/21  0513 06/25/21  0815   SODIUM 134* 149* 153*   POTASSIUM 5.7* 4.7 4.8   CHLORIDE 89* 94* 94*   CO2 10* 9* 12*   GLUCOSE 568* 365* 316*   BUN 23* 28* 27*     INR   Date Value Ref Range Status   06/25/2021 >=10.00 () 0.87 - 1.13 Final     Comment:     INR - Non-therapeutic Reference Range: 0.87-1.13  INR - Therapeutic Reference Range: 2.0-4.0       No results found for: POCINR     Intake/Output Summary (Last 24 hours) at 6/25/2021 1058  Last data filed at 6/25/2021 0930  Gross per 24 hour    Intake 4806.45 ml   Output 1100 ml   Net 3706.45 ml      Labs not explicitly included in this progress note were reviewed by the author. Radiology/imaging not explicitly included in this progress note was reviewed by the author.     I have performed a physical exam and reviewed and updated ROS and Plan today (6/25/2021).       20 minutes in directly providing and coordinating care and extensive data review.  No time overlap and excludes procedures.

## 2021-06-25 NOTE — PROGRESS NOTES
IR Nursing Note:    Patient arrived to IR suite with 2 ICU RN, RT and intensivist.  Patient with right femoral arterial line, right central line, intubated and pressors infusing.  Right leg dusky without palpable pedal pulse bilaterally.      Patient underwent a bilateral Pulmonary angiogram with thrombolytic catheter placement by Dr. Sandra. Procedure site was marked by MD and verified using imaging guidance.  Patient was placed in a supine position.  Left femoral vein accessed.  Vitals were taken every 3 minutes and remained stable during procedure (see doc flow sheet for results). Sheath sutured in.    A Tegaderm and biopatch dressing was placed over surgical site. Report called to Karin COLE. Pt transported by bed with 2ICU RN, RT and intensivist to T632.

## 2021-06-25 NOTE — CARE PLAN
Problem: Ventilation  Goal: Ability to achieve and maintain unassisted ventilation or tolerate decreased levels of ventilator support  Description: Document on Vent flowsheet    1.  Support and monitor invasive and noninvasive mechanical ventilation  2.  Monitor ventilator weaning response  3.  Perform ventilator associated pneumonia prevention interventions  4.  Manage ventilation therapy by monitoring diagnostic test results  Outcome: Not Progressing      Ventilator Daily Summary    Vent Day #  2  Ventilator settings changed this shift:  PEEP to16  Weaning trials:  no  Respiratory Procedures:  no  Plan: Continue current ventilator settings and wean mechanical ventilation as tolerated per physician orders.   [phone]

## 2021-06-25 NOTE — PROCEDURES
Central Line Insertion    Date/Time: 6/24/2021 8:14 PM  Performed by: Amadeo Neely D.O.  Authorized by: Amadeo Neely D.O.     Consent:     Consent obtained:  Verbal    Consent given by:  Patient and spouse    Risks discussed:  Incorrect placement, infection, bleeding and pneumothorax    Alternatives discussed:  No treatment  Pre-procedure details:     Hand hygiene: Hand hygiene performed prior to insertion      Sterile barrier technique: All elements of maximal sterile technique followed      Skin preparation:  2% chlorhexidine    Skin preparation agent: Skin preparation agent completely dried prior to procedure    Sedation:     Sedation type:  Deep  Anesthesia:     Anesthesia method:  None  Procedure details:     Location:  R subclavian    Patient position:  Trendelenburg    Procedural supplies:  Triple lumen    Catheter size:  7 Fr    Landmarks identified: yes      Ultrasound guidance: no      Number of attempts:  1    Successful placement: yes    Post-procedure details:     Post-procedure:  Line sutured and dressing applied    Assessment:  Blood return through all ports, free fluid flow, no pneumothorax on x-ray and placement verified by x-ray    Patient tolerance of procedure:  Tolerated well, no immediate complications  Comments:      R subclavian line seen going up into right internal jugular instead of down into SVC. R subclavian line removed.

## 2021-06-25 NOTE — PROCEDURES
Arterial Line Insertion    Date/Time: 6/24/2021 8:17 PM  Performed by: Amadeo Neely D.O.  Authorized by: Amadeo Neely D.O.   Preparation: Patient was prepped and draped in the usual sterile fashion.  Indications: multiple ABGs, respiratory failure and hemodynamic monitoring  Location: right femoral    Sedation:  Patient sedated: yes    Needle gauge: 20  Seldinger technique: Seldinger technique used  Number of attempts: 1  Post-procedure: line sutured and dressing applied  Patient tolerance: patient tolerated the procedure well with no immediate complications

## 2021-06-25 NOTE — PROCEDURES
Central Line Insertion    Date/Time: 6/25/2021 1:17 PM  Performed by: Donny Locke D.O.  Authorized by: Donny Locke D.O.     Consent:     Consent obtained:  Emergent situation  Universal protocol:     Site/side marked: yes      Immediately prior to procedure, a time out was called: yes    Pre-procedure details:     Hand hygiene: Hand hygiene performed prior to insertion      Sterile barrier technique: All elements of maximal sterile technique followed      Skin preparation:  2% chlorhexidine and ChloraPrep    Skin preparation agent: Skin preparation agent completely dried prior to procedure    Anesthesia:     Anesthesia method:  Local infiltration    Local anesthetic:  Lidocaine 1% w/o epi  Procedure details:     Location:  R internal jugular    Patient position:  Flat    Procedural supplies:  Triple lumen    Catheter size:  7 Fr    Landmarks identified: yes      Ultrasound guidance: yes      Sterile ultrasound techniques: Sterile gel and sterile probe covers were used      Number of attempts:  1    Successful placement: yes    Post-procedure details:     Post-procedure:  Dressing applied and line sutured    Assessment:  Blood return through all ports, no pneumothorax on x-ray, placement verified by x-ray and free fluid flow    Patient tolerance of procedure:  Tolerated well, no immediate complications

## 2021-06-25 NOTE — PLAN OF CARE (IOPOC)
50 year old female admitted for hypoxic respiratory failure secondary to COVID-19.    Prior to my arrival she was intubated then suffered a cardiac arrest. See Dr. Neely's documentation for his management including empiric TPa followed by EKOs directed TPa.    The patient was repositioned in reverse [place], placed on Flolan for RVF and refractory hypoxia, ventilator management optimized within lung protective strategy parameters. ECHO demonstrates significant RV dysfunction. Despite no sedation or paralytic she is unresponsive and not triggering the ventilator in the setting of a massive metabolic acidosis.      She was placed on dobutamine, epi, NE, vaso, and sima - discussed with family regarding prognosis and goals of care. They understand the futility in CPR but for now would prefer her to remain FULL CODE. Unfortunately with the EKOs catheter placed and profound shock positioning her in a prone position or transferring to an ECMO center is not possible. Bicarb infusion + bicarb pushes + CaCl pushes to optimize hemodynamics in addition to pressors.     Serial labs demonstrate improved PaO2/reduced PCO2 on flolan and with ventilator adjustments. Lactic acid remains incredibly high coupled with rapidly reducing hemoglobin followed by prior TPa systemically and catheter directed.      2 units pRBC delivered, will plan to discuss with IR removing EKOs catheter asap as there was no large visualized clot and harm > benefit at this point.     Though it is a long shot - her only chance is that if hemorrhage is significantly contributing to her shock and reversal of this will allow for improvement of acid/base leading to improved hemodynamics/lactate clearance with then subsequent consideration for ECMO center transfer.    The patient remains critically ill.  Critical care time = 90 minutes in directly providing and coordinating critical care and extensive data review.  No time overlap and excludes  procedures.

## 2021-06-25 NOTE — DISCHARGE PLANNING
Medical Social Work     Code Blue    DONATO responded to a code blue. The medical team was able to get pulses back. DONATO was able to make contact with the pt  Dov Beverley [phone], DONATO advised Dov of the change in the pt medical status. [facility] Dov stated he would wait for a call from the MD. DONATO notified the RN that the pt  Dov is waiting for a call from the RN or MD for a better update on the pt medical status.

## 2021-06-25 NOTE — PROGRESS NOTES
Spiritual Care Note    Patient Information     Patient's Name: Olga Valdes   MRN: [id number]    YOB: 1970   Age and Gender: 50 y.o. female   Service Area: CARDIAC ICU [facility]   Room (and Bed): [facility]/   Ethnicity or Nationality:     Primary Language: English   Spiritism/Spiritual preference: Nothing in Particular   Place of Residence: [place]   Family/Friends/Others Present: , Daughter   Clinical Team Present: RNx2, MD, Pharm,    Medical Diagnosis(-es)/Procedure(s): Acute hypoxemic respiratory failure due to COVID-19 (HCC)      Code Status: DNAR/DNI    Date of Admission: 6/16/2021   Length of Stay: 9 days        Spiritual Care Provider Information:  Name of Spiritual Care Provider: Sania Tran  Title of Spiritual Care Provider: Associate Parkinson  Phone Number: [phone]  E-mail: [email or web address]  Total time : 15 minutes    Spiritual Screen Results:      Encounter/Request Information  Encounter/Request Type   Visited With: Patient and family together, Health care provider  Nature of the Visit: Initial, On shift  Crisis Visit: Critical care  Referral From/ Origin of Request: [place] management rounds    Spiritual Assessment   Spiritual Care Encounters    Observations/Symptoms: Other (Comment) (Pt intub/unresp; , daughter BS)    Interacton/Conversation:  responded to request from RN regarding critical situation of pt, and support for family BS.   donned all appriate PPE and entered pt's room.   and daughter were quiet - pt unrespon.   stated son is on way from [place] - deciding to drive instead of flying.   and daughter very quiet, to  appear to simply be in a 'numb state' of surprise at pt's rapid decline and poor prognosis.       stated pt has sister that lives out of state.   offered that if there is desire to connect family virtually and in person that hospital could arrange that  through a zoom call -  appreciated info - just needed time to continue processing the gravity of pt's situation.   is available throughout day for continued support for pt / family / staff needs.    Assessment: Distress    Distress: Upsetting Diagnosis/Prognosis, Overwhelmed (family above assessment)    Intended Effects: Demonstrate Caring and Concern, Journeying With Someone in Grief Process (family)    Interventions: Compassionate Presence, Reflective Listening    Outcomes: Ability to Communicate with Truth and Honesty (family)    Plan: Continue with Family    Notes:

## 2021-06-25 NOTE — PROCEDURES
Arterial Line Insertion    Date/Time: 6/24/2021 8:16 PM  Performed by: Amadeo Neely D.O.  Authorized by: Amadeo Neely D.O.   Indications: multiple ABGs, respiratory failure and hemodynamic monitoring  Location: right radial    Sedation:  Patient sedated: yes    Matt's test normal: yes  Needle gauge: 20  Seldinger technique: Seldinger technique used  Number of attempts: 3  Comments: R radial arterial line entered with needle under U/S.  Unable to advance guide wire into R radial artery.  After 3 attempts procedure abandoned and R femoral arterial line placed.

## 2021-06-25 NOTE — PROGRESS NOTES
Critical Care Progress Note    Date of admission  6/16/2021    Chief Complaint  50 y.o. female admitted 6/16/2021 with acute hypoxic respiratory failure due to ARDS due to severe COVID pneumonia    Hospital Course  6/24 worsening hypoxia at 7pm, s/p intubation  6/24 upon proning, pt became hemodynamically unstable and coded at 9pm. PEA arrest, 1 round of CPR    6/24 shock - multifactorial: septic, cardiogenic  Bedside echo with evidence of RV dilation and mild RV dysfunction    S/p tPA 50mg x1 and 40mg IV x1  6/25 S/p emergent EKOS by IR at 1am     6/25 am. Post EKOS, pt remains in profound shock with NE at 100, EPI at 40, vasopressin at 0.04, dobutamine at 5, phenylephrine at 300  Full vent support 100%/PEEP 10  PH 6.9  Lactate rising quickly to 20 at 6am      Interval Problem Update  Reviewed last 24 hour events:  Coded at 9pm, PEA arrest, 1 round CPR  S/p emergent tPA x2 and EKOS   Pt in profound shock with above maximum inotropes and pressor support.   Family was at bedside, overwhelmed, in tears    7am: I had discussion with family regarding extremely poor prognosis. I offered my deep condolence about what's happening. DNR status was made   on NE at 100, EPI at 40, vaso at 0.04, dobutamine at 5, phenylephrine 300  Bicarb gtt was increased to 200cc/hr. Multiple amps of bicarb was given.     8am: repeat ABG reviewed with pH 7.0/45/49. Will give 2 more amps of bicarb  EKOS has been stopped. Wires out.   S/p 2U PRBC, 4U FFP and 1U platelet. Cryo is infusing.   MAP >65 with all four pressors, same dose  TXA given    9am: right leg extremities was more edematous and worsening with increased mottling. DP/TP pulses were dopplerable. Extremities warm bilaterally.     10am: right new IJ triple lumen was placed and left radial art line was placed.   Labs are back with fibrinogen 61, INR 8 to >10. Lactate >22. Serial ABGs with pH 6.9 to 7.0    12pm: we have given total 4U PRBCs, 4U FFPs, 1U platelet, 4units of  cryoprecitipate and TXA given.     Neuro: on fentanyl gtt  Cardiac: on NE at 100, EPI at 40, vaso at 0.04, dobutamine at 5, phenylephrine 300  Resp: on full vent support 100%/PEEP 10  Renal: creatinine 2.09, K 4.7, HCO3 9, sodium 149. No UOP  Heme: EKOS tpa infusion was stopped this am due to concern of active hemorrhagic shock   - transfused 2U PRBC from overnight  - giving 4U FFP, 2U PRBC, 1U platelet, 2units cryo and TXA.   ID; on antibiotics    Update: around 3pm, pt developed long run of monomorphic Vtach with MAP in 40s. Given patient's poor outcome, we decided not to shock and escalate any care. Vtach was self-converted after about few minutes.     I reassessed patient frequently throughout the day and updated family multiple times. I re-examined her right leg around 4pm, with not change from edema standpoint.     Review of Systems  Review of Systems   Reason unable to perform ROS: intubated, sedated, RASS -4, unable to perform.        Vital Signs for last 24 hours   Temp:  [36.3 °C (97.4 °F)-37.7 °C (99.9 °F)] 36.6 °C (97.9 °F)  Pulse:  [] 135  Resp:  [2-39] 38  BP: ()/(24-80) 135/45  SpO2:  [16 %-95 %] 36 %    Hemodynamic parameters for last 24 hours       Respiratory Information for the last 24 hours  Vent Mode: APVCMV  Rate (breaths/min): 38  Vt Target (mL): 430  PEEP/CPAP: 10  MAP: 18  Control VTE (exp VT): 432    Physical Exam   Physical Exam  Vitals and nursing note reviewed.   Constitutional:       Comments: Intubated   HENT:      Head: Normocephalic.      Mouth/Throat:      Comments: ET tube in place  Cardiovascular:      Rate and Rhythm: Tachycardia present.      Pulses: Normal pulses.      Comments: Long runs of vtach around 3pm, self terminated  Pulmonary:      Comments: Diminished BS at bases  Abdominal:      General: Bowel sounds are normal. There is no distension.      Tenderness: There is no abdominal tenderness. There is no guarding.   Musculoskeletal:      Right lower leg: Edema  present.      Left lower leg: Edema present.   Skin:     General: Skin is warm.      Capillary Refill: Capillary refill takes 2 to 3 seconds.      Comments: Evolving mottling in right LE throughout the day  In the afternoon, pt was re-examined and mottles noted in left lower ext.    Neurological:      Comments: Unable to obtain due to mental status   Psychiatric:      Comments: Unable to obtain due to mental status         Medications  Current Facility-Administered Medications   Medication Dose Route Frequency Provider Last Rate Last Admin   • EPINEPHrine (Adrenalin) infusion 16 mg/500 mL (premix)  0-30 mcg/min Intravenous Continuous Cooper Enriquez M.D. 75 mL/hr at 06/25/21 0533 40 mcg/min at 06/25/21 0533   • norepinephrine (Levophed) infusion 32 mg/500 mL (premix)  0-30 mcg/min Intravenous Continuous Cooper Enriquez M.D. 93.8 mL/hr at 06/25/21 0300 100 mcg/min at 06/25/21 0300   • hydrocortisone sodium succinate PF (SOLU-CORTEF) 100 MG injection 50 mg  50 mg Intravenous Q6HRS Cooper Enriquez M.D.       • midazolam (VERSED) premix 125 mg/125 mL NS  10 mg/hr Intravenous Continuous Cooper Enriquez M.D.   Held at 06/25/21 0229   • SODIUM BICARBONATE 8.4 % IV SOLN            • SODIUM BICARBONATE 8.4 % IV SOLN            • DOBUTamine (DOBUTREX) 1 mg/mL premix infusion  5 mcg/kg/min Intravenous Continuous Cooper Enriquez M.D. 22.7 mL/hr at 06/25/21 0300 5 mcg/kg/min at 06/25/21 0300   • piperacillin-tazobactam (ZOSYN) 4.5 g in  mL IVPB  4.5 g Intravenous Q8HRS Cooper Enriquez M.D.       • Linezolid (ZYVOX) premix 600 mg  600 mg Intravenous Q12HRS Cooper Enriquez M.D.   Stopped at 06/25/21 0614   • CALCIUM CHLORIDE 10 % IV SOLN            • insulin regular (HumuLIN R,NovoLIN R) injection  0-14 Units Intravenous Once Cooper Enriquez M.D.       • insulin regular human (HUMULIN/NOVOLIN R) 62.5 Units in  mL infusion per protocol  0-29 Units/hr Intravenous Continuous Cooper Enriquez M.D. 16 mL/hr  at 06/25/21 0638 4 Units/hr at 06/25/21 0638   • dextrose 50% (D50W) injection 25-50 mL  25-50 mL Intravenous PRN Cooper Enriquez M.D.       • heparin infusion 25,000 units in 500 mL 0.45% NACL  0-30 Units/kg/hr Intravenous Continuous Cooper Enriquez M.D.   Held at 06/25/21 0545   • heparin injection 2,000 Units  2,000 Units Intravenous PRN Cooper Enriquez M.D.       • Tranexamic Acid (CYKLOKAPRON) 1,000 mg in  mL IVPB  1,000 mg Intravenous Once Donny Locke D.O.       • Tranexamic Acid (CYKLOKAPRON) 1,000 mg in NS 1,000 mL IVPB  1,000 mg Intravenous Continuous Donny Locke D.O.       • vasopressin (VASOSTRICT) 20 Units in  mL Infusion  0.04 Units/min Intravenous Continuous Cooper Enriquez M.D. 12 mL/hr at 06/25/21 0517 0.04 Units/min at 06/25/21 0517   • Respiratory Therapy Consult   Nebulization Continuous RT Amadeo Neely D.O.       • famotidine (PEPCID) tablet 20 mg  20 mg Enteral Tube Q12HRS Amadeo Neely D.O.        Or   • famotidine (PEPCID) injection 20 mg  20 mg Intravenous Q12HRS Amadeo Neely D.O.   20 mg at 06/25/21 0514   • senna-docusate (PERICOLACE or SENOKOT S) 8.6-50 MG per tablet 2 tablet  2 tablet Enteral Tube BID Amadeo Neely D.O.        And   • polyethylene glycol/lytes (MIRALAX) PACKET 1 Packet  1 Packet Enteral Tube QDAY PRN Amadeo Neely D.O.        And   • magnesium hydroxide (MILK OF MAGNESIA) suspension 30 mL  30 mL Enteral Tube QDAY PRN Amadeo Neely D.O.        And   • bisacodyl (DULCOLAX) suppository 10 mg  10 mg Rectal QDAY PRN Amadeo Neely D.O.       • MD Alert...ICU Electrolyte Replacement per Pharmacy   Other PHARMACY TO DOSE Amadeo Neely D.O.       • lidocaine (XYLOCAINE) 1 % injection 2 mL  2 mL Tracheal Tube Q30 MIN PRN Amadeo Neely D.O.       • fentaNYL (SUBLIMAZE) injection 100 mcg  100 mcg Intravenous Q15 MIN PRN SASHA Victoria.O.        And   • fentaNYL (SUBLIMAZE) injection 200 mcg  200 mcg Intravenous Q15 MIN PRN Amadeo Neely,  D.O.       • artificial tears (EYE LUBRICANT) ophth ointment 1 Application  1 Application Both Eyes Q8HRS Amadeo Florinda, D.O.   1 Application at 06/25/21 0600   • fentaNYL (SUBLIMAZE) 50 mcg/mL in 50mL (Continuous Infusion)   Intravenous Continuous Amadeo Florinda, D.O.   75 mcg at 06/24/21 1915   • vecuronium (NORCURON) injection 7.5 mg  7.5 mg Intravenous Once Amadeo Florinda, D.O.       • vecuronium (NORCURON) 60 mg in dextrose 5% 500 mL Infusion  0-1.7 mcg/kg/min Intravenous Continuous Amadeo Florinda, D.O.       • vecuronium (NORCURON) injection 7.5 mg  7.5 mg Intravenous Q2HRS PRN Amadeo Florinda, D.O.       • alteplase (ACTIVASE) SOLR 10 mg  10 mg Intravenous Once Amadeo Florinda, D.O.        Followed by   • NS infusion   Intravenous Once Amadeo Florinda, D.O.       • sodium bicarbonate 150 mEq in D5W infusion (premix)   Intravenous Continuous Amadeo Florinda, D.O. 100 mL/hr at 06/25/21 0300 Rate Change at 06/25/21 0300   • phenylephrine (GRACIE-SYNEPHRINE) 40 mg in  mL infusion  0-300 mcg/min Intravenous Continuous Amadeo Florinda, D.O. 112.5 mL/hr at 06/25/21 0300 300 mcg/min at 06/25/21 0300   • epoprostenol (FLOLAN) 1.5 mg in glycine diluent for flolan 50 mL for Inhalation  0.01-0.05 mcg/kg/min (Order-Specific) Inhalation Continuous Cooper Enriquez M.D. 7.565 mL/hr at 06/25/21 0540 0.048 mcg/kg/min at 06/25/21 0540   • acetaminophen (Tylenol) tablet 650 mg  650 mg Oral Q6HRS PRN Maria Victoria Greer M.D.   650 mg at 06/24/21 1229   • ondansetron (ZOFRAN) syringe/vial injection 4 mg  4 mg Intravenous Q6HRS PRN Ankit Samuels M.D.   4 mg at 06/20/21 0903   • ondansetron (ZOFRAN ODT) dispertab 4 mg  4 mg Oral Q6HRS PRN Ankit Samuels M.D.       • vitamin D (cholecalciferol) tablet 1,000 Units  1,000 Units Oral QAM Ankit Samuels M.D.   1,000 Units at 06/24/21 0543   • levothyroxine (SYNTHROID) tablet 125 mcg  125 mcg Oral AM ES Ankit Samuels M.D.   125 mcg at 06/24/21 0543   • levothyroxine (SYNTHROID) tablet 137  mcg  137 mcg Oral AM ES Ankit Samuels M.D.   137 mcg at 06/24/21 0543       Fluids    Intake/Output Summary (Last 24 hours) at 6/25/2021 0734  Last data filed at 6/25/2021 0638  Gross per 24 hour   Intake 3206.2 ml   Output 1275 ml   Net 1931.2 ml       Laboratory  Recent Labs     06/24/21  2242 06/25/21  0205 06/25/21  0535   ISTATAPH 7.102* 6.936* 6.971*   ISTATAPCO2 65.6* 54.2* 46.8*   ISTATAPO2 53* 65 67   ISTATATCO2 22 13* 12*   HPCPNNS0ZLI 73* 75* 79*   ISTATARTHCO3 20.5 11.5* 10.8*   ISTATARTBE -10* -20* -19*   ISTATTEMP 36.2 C 35.7 C 36.0 C   ISTATFIO2 100 100 100   ISTATSPEC Arterial Arterial Arterial   ISTATAPHTC 7.112* 6.951* 6.983*   ZOLACGKY2RZ 50* 59* 62*         Recent Labs     06/24/21  0555 06/25/21  0159 06/25/21  0513   SODIUM 134* 134* 149*   POTASSIUM 4.5 5.7* 4.7   CHLORIDE 93* 89* 94*   CO2 30 10* 9*   BUN 22 23* 28*   CREATININE 0.57 1.90* 2.09*   MAGNESIUM  --  2.8*  --    PHOSPHORUS  --  15.1*  --    CALCIUM 9.0 6.9* 8.8     Recent Labs     06/24/21  0555 06/25/21  0159 06/25/21  0513   ALTSGPT  --   --  894*   ASTSGOT  --   --  1461*   ALKPHOSPHAT  --   --  73   TBILIRUBIN  --   --  1.3   GLUCOSE 174* 568* 365*     Recent Labs     06/23/21  0530 06/23/21  0530 06/24/21  0555 06/25/21  0159 06/25/21  0451 06/25/21  0513   WBC 12.9*   < > 16.4* 51.1* 44.0*  --    NEUTSPOLYS 93.50*  --  92.60* 85.10*  --   --    LYMPHOCYTES 3.30*  --  4.40* 11.40*  --   --    MONOCYTES 2.00  --  1.50 0.90  --   --    EOSINOPHILS 0.20  --  0.20 0.00  --   --    BASOPHILS 0.20  --  0.30 0.00  --   --    ASTSGOT  --   --   --   --   --  1461*   ALTSGPT  --   --   --   --   --  894*   ALKPHOSPHAT  --   --   --   --   --  73   TBILIRUBIN  --   --   --   --   --  1.3    < > = values in this interval not displayed.     Recent Labs     06/24/21  0555 06/25/21  0159 06/25/21  0451 06/25/21  0513   RBC 5.48* 3.03* 2.08*  --    HEMOGLOBIN 16.4* 9.4* 6.5*  --    HEMATOCRIT 47.0 31.0* 21.7*  --    PLATELETCT 81* 104*  88*  --    PROTHROMBTM  --  31.9*  --  65.8*   APTT  --  155.6*  --  239.8*   INR  --  3.21*  --  8.19*       Imaging  X-Ray:  I have personally reviewed the images and compared with prior images.  Echo:   Reviewed    Assessment/Plan  * Shock (HCC)  Assessment & Plan  Shock - mulitfactorial - cardiogenic and hemorrhagic.   Cardiogenic given RV strain noted on 6/24 evening, concerning of massive PE. S/p tpA 50mg x2 and EKOS  Unfortunately, pt developed bleeding post EKOS, causing Hgb drop to 5s, leading to profound shock, profound metabolic acidemia and multiorgan failure. Lactic acid were very elevated to >22  Pt requires above max dose of NE gtt at 100, EPI gtt at 40, vasopressin at 0.04, phenylephrine at 300, and dobutamine at 5  Pt received total 6U PRBCs, 4U FFPs, 1U platelet, 4U cryoprecipitates and TXA.   Pt received multiple doses of bicarb, and currently on bicarb gtt at 200cc/hr.   Frequent labs including ABGs, CBC, INR, fibrinogen, ionized CA    Assessment: very poor prognosis given profound shock in the setting of acute hypoxic resp fialure/ARDS/COVID 19 and multiorgan failure. I was ana with the family ( and daughter) this am regarding this poor prognosis. Pt's code status is DNR.     Plan:   Continue inotropes and pressors to titrate MAP >65   Frequent labs ABGs, CBC, INR, fibrinogen, and ionize Ca  Goal to keep pH >7.20 if able, CBC  Will not escalate care as this is futile to treatment. Will not shock.   Family is expecting a son who's coming from [place].   Code status: DNR. No escalation of care.     Acute hypoxemic respiratory failure due to COVID-19 (HCC)- (present on admission)  Assessment & Plan  SARS-CoV-2/COVID-19  Worsening acute hypoxic resp failure requiring intubation 6/24, proning, but unfortunately went into cardiac arrest last night.   Pt's clinical course evolved into worsening shock and multiorgan failure and profound metabolic acidemia, shock and bleeding.   Currently on  full vent support, FiO2 100%, PEEP 14.   Serial ABGs.   On dexamethasone        DIC (disseminated intravascular coagulation) (HCC)  Assessment & Plan  Fibrinogen low with elevated both PT/INR and PTT in the setting of recent tpA, EKOS  S/p 4units cryoprecipitate and TXA.   Monitor coags closely  See detail under shock section       VTE:  Contraindicated  Ulcer: PPI  Lines: Central Line  Ongoing indication addressed, Arterial Line  Ongoing indication addressed and Ya Catheter  Ongoing indication addressed    I have performed a physical exam and reviewed and updated ROS and Plan today (6/25/2021). In review of yesterday's note (6/24/2021), there are no changes except as documented above.     I have assessed and reassessed her respiratory status, blood pressure, hemodynamics, cardiovascular and neurological status throughout the day. She is extremely critically ill with very poor prognosis. On above maximum support of pressors and intoropes. This is not survivable. Had long discussions with family and repeated discussions regarding critical condition. Patient is DNR. High risk of deterioration and worsening vital organ dysfunction and death without the above critical care interventions.  Will not escalate care.     Discussed patient condition and risk of morbidity and/or mortality with Family, RN, RT, Pharmacy and Charge nurse / hot rounds  The patient remains critically ill.  Critical care time = 210 minutes in directly providing and coordinating critical care and extensive data review.  No time overlap and excludes procedures.

## 2021-06-25 NOTE — PROGRESS NOTES
50 year old female admitted for hypoxic respiratory failure secondary to COVID-19.     Prior to my arrival she was intubated then suffered a cardiac arrest. See Dr. Neely's documentation for his management including empiric TPa followed by EKOs directed TPa.    The patient was repositioned in reverse [place], placed on Flolan for RVF and refractory hypoxia, ventilator management optimized within lung protective strategy parameters. ECHO demonstrates significant RV dysfunction. Despite no sedation or paralytic she is unresponsive and not triggering the ventilator in the setting of a massive metabolic acidosis.       She was placed on dobutamine, epi, NE, vaso, and sima - discussed with family regarding prognosis and goals of care. They understand the futility in CPR but for now would prefer her to remain FULL CODE. Unfortunately with the EKOs catheter placed and profound shock positioning her in a prone position or transferring to an ECMO center is not possible. Bicarb infusion + bicarb pushes + CaCl pushes to optimize hemodynamics in addition to pressors.      Placed on empiric broad spectrum antibiotics and given hydrocortisone 50 mg q 6 hours     Serial labs demonstrate improved PaO2/reduced PCO2 on flolan and with ventilator adjustments. Lactic acid remains incredibly high coupled with rapidly reducing hemoglobin followed by prior TPa systemically and catheter directed.      2 units pRBC delivered, will plan to discuss with IR removing EKOs catheter asap as there was no large visualized clot and harm > benefit at this point.      Though it is a long shot - her only chance is that if hemorrhage is significantly contributing to her shock and reversal of this will allow for improvement of acid/base leading to improved hemodynamics/lactate clearance with then subsequent consideration for ECMO center transfer.     The patient remains critically ill.  Critical care time = 90 minutes in directly providing and  coordinating critical care and extensive data review.  No time overlap and excludes procedures.

## 2021-06-25 NOTE — PROCEDURES
Arterial Line Insertion    Date/Time: 6/25/2021 1:19 PM  Performed by: Donny Locke D.O.  Authorized by: Donny Locke D.O.   Preparation: Patient was prepped and draped in the usual sterile fashion.  Indications: multiple ABGs, respiratory failure and hemodynamic monitoring  Location: left radial  Anesthesia: local infiltration    Anesthesia:  Local Anesthetic: lidocaine 1% without epinephrine    Sedation:  Patient sedated: no    Needle gauge: 20  Seldinger technique: Seldinger technique used  Number of attempts: 1  Post-procedure: line sutured and dressing applied  Post-procedure CMS: normal

## 2021-06-25 NOTE — PROGRESS NOTES
Exception to Visitation Policy     The patient's acuity has spiked dramatically, including a brief cardiac arrest. As the patient is experiencing a greater likelihood of poor outcome including death the family to include her  and daughter were invited to her bedside outside of our official visiting hours. Appropriate precautions in reagrds to personal protective equipment have been taken to allow

## 2021-06-25 NOTE — PROCEDURES
CPR Procedure Note:    Pt prone and noted to be hypotensive, hypoxic, with a decreasing heart rate.    Pt placed in supine position.  Pt's heart rate continued to decrease and then pt had no perfusing rhythm.    CPR was initiated per ACLS protocol for PEA arrest.    Pt given 1 mg epi IV.  CPR was conducted for 2 mins, and then a pulse check was performed.    At pulse check pt found to have left femoral pulse.    An ABG was run, which showed pt's pO2 was 40, pCO2 80, and pH 6.9.  H's and T's considered for cause of pt's arrest.  Pt thought to have PE which caused PEA arrest.  Pt was given systemic tPA for massive PE.    A state bedside echocardiogram was performed which showed a massively dilated RV and high PA pressures.    IR was consulted for emergent anti-thrombolytics for possible PE.  Pt had EKOS catheters placed by IR for PE lytic therapy.

## 2021-06-26 ENCOUNTER — APPOINTMENT (OUTPATIENT)
Dept: RADIOLOGY | Facility: MEDICAL CENTER | Age: 51
DRG: 166 | End: 2021-06-26
Attending: INTERNAL MEDICINE
Payer: COMMERCIAL

## 2021-06-26 NOTE — PROGRESS NOTES
Discussed plan of care with Dr. Locke and charge nurse Juan, it was determined that patient would be no escalation of care per Dr. Locke.    Passed along to Karin COLE for night shift. No escalation of care.      Valorie Chisholm RN

## 2021-06-26 NOTE — PROGRESS NOTES
Pt's heart rate stopped. Pt became asystole. Pt's spouse and daughter at bedside. Son on his way to the hospital. 2 RN pronounced, physician shortly after. RT turned vent off. Case management arrived to room. Staff providing emotional support to family. Pt is not a 's case. Pt is not a candidate for donation. Family resources paperwork provided. No mortuary selected at this time. NAM notified.

## 2021-06-26 NOTE — DISCHARGE SUMMARY
Death Summary    Cause of Death  Profound cardiogenic and hemorrhagic shock with multiorgan failure due to acute hypoxic respiratory failure due to severe COVID-19 pneumonia    Comorbid Conditions at the Time of Death  Principal Problem:    Shock (HCC) POA: Unknown  Active Problems:    Acute hypoxemic respiratory failure due to COVID-19 (HCC) POA: Yes    Hypothyroidism POA: Yes    Hyponatremia POA: Yes    Hypokalemia POA: No    Hyperglycemia POA: Yes    DIC (disseminated intravascular coagulation) (HCC) POA: Unknown  Resolved Problems:    * No resolved hospital problems. *      History of Presenting Illness and Hospital Course  This is a 50 y.o. female admitted 6/16/2021 with acute hypoxic respiratory failure due to severe COVID-19 pneumonia. Patient's respiratory status became worsened during her hospital stay, requiring ICU admission on 6/19 and intubation on 6/24. She developed cardiac arrest on the evening of 6/24 and there was concern of high probability of pulmonary embolism causing the worsening of her respiratory failure. Pt received systemic thrombolytics and followed by EKOS. Unfortunately patient suffered hemorrhagic shock in addition to her likely cardiogenic shock and existing acute respiratory failure from COVID-19 pneumonia. She was in very critical condition requiring more than maximum support for inotropes and vasopressors. She also had multiple blood product transfusions. We had goal of care discussion with family regarding her very poor prognosis and code status DNR was made. Patient eventually passed away on 6/25 at 2120.    Death Date: 06/25/21   Death Time: 2120      Pronounced By (MD): Brannon Acosta  Pronounced By (RN1): Karin Ratliff  Pronounced By (RN2): Cooper Montelongo

## 2021-06-26 NOTE — PROGRESS NOTES
0940 - Rapid transfuser used. 3 PRBC given and 1 Platelet from Massive Transfusion box.    Total of 4 PRBC, 4 Plasma, 4 Cryo and 1 Platelet given today. No signs of any transfusion reaction observed.      Valorie Chisholm RN

## 2021-06-26 NOTE — PROGRESS NOTES
"SonDerek arrived at bedside. Family spent time with pt alone. Family given \"Family resources guide\" before leaving. Family stated they had all pt's belongings. Postmortem care provided. Armband in place. Pt placed in body bag with death paperwork. [facility] called for transport.   "

## 2021-06-26 NOTE — ASSESSMENT & PLAN NOTE
Fibrinogen low with elevated both PT/INR and PTT in the setting of recent tpA, EKOS  S/p 4units cryoprecipitate and TXA.   Monitor coags closely  See detail under shock section

## 2021-06-26 NOTE — ASSESSMENT & PLAN NOTE
Shock - mulitfactorial - cardiogenic and hemorrhagic.   Cardiogenic given RV strain noted on 6/24 evening, concerning of massive PE. S/p tpA 50mg x2 and EKOS  Unfortunately, pt developed bleeding post EKOS, causing Hgb drop to 5s, leading to profound shock, profound metabolic acidemia and multiorgan failure. Lactic acid were very elevated to >22  Pt requires above max dose of NE gtt at 100, EPI gtt at 40, vasopressin at 0.04, phenylephrine at 300, and dobutamine at 5  Pt received total 6U PRBCs, 4U FFPs, 1U platelet, 4U cryoprecipitates and TXA.   Pt received multiple doses of bicarb, and currently on bicarb gtt at 200cc/hr.   Frequent labs including ABGs, CBC, INR, fibrinogen, ionized CA    Assessment: very poor prognosis given profound shock in the setting of acute hypoxic resp fialure/ARDS/COVID 19 and multiorgan failure. I was ana with the family ( and daughter) this am regarding this poor prognosis. Pt's code status is DNR.     Plan:   Continue inotropes and pressors to titrate MAP >65   Frequent labs ABGs, CBC, INR, fibrinogen, and ionize Ca  Goal to keep pH >7.20 if able, CBC  Will not escalate care as this is futile to treatment. Will not shock.   Family is expecting a son who's coming from [place].   Code status: DNR. No escalation of care.

## 2021-06-26 NOTE — DISCHARGE PLANNING
Medical Social Work     SW received a call from the RN requesting SW to bedside. The RN advised SW that the pt is going to die. SW met with the pt family and provided emotional support. SW advised the ER lobby of more family coming for the pt. The pt son will be escorted up to the pt room once he arrives to [facility].     Plan: SW will remain available for family support.

## 2021-06-29 NOTE — DOCUMENTATION QUERY
[facility]                                                                       Query Response Note      PATIENT:               PETER AYALA  ACCT #:                  [id number]  MRN:                     [id number]  :                      1970  ADMIT DATE:       2021 12:34 PM  DISCH DATE:        2021 9:20 PM  RESPONDING  PROVIDER #:        [id number]           QUERY TEXT:    Please clarify the relationship, if any, between DIC, Hemorrhagic shock and TPA via EKOS    NOTE:  If an appropriate response is not listed below, please respond with a new note.        The patient's Clinical Indicators include:  21: Unfortunately, pt developed bleeding post EKOS, causing Hgb drop to 5s, leading to profound shock, profound metabolic acidemia and multiorgan failure.    DIC- Fibrinogen low with elevated both PT/INR and PTT in the setting of recent tpA, EKOS   21 MD PN: EKOS tpa infusion was stopped this am due to concern of active hemorrhagic shock     Treatment: Cryoprecipitate transfusion, TXA, Monitor coag lab results, pRBC transfusion, FFP transfusion, Platelet transfusion, TPA infusion held  Risk Factors: Covid-19, ARDS, Pneumonia, DIC, Hemorrhagic shock, PE  Options provided:   -- DIC and hemorrhagic shock are due to/associated with use of TPA (hemorrhagic disorder due to circulating anticoagulant)   -- DIC and hemorrhagic shock are not due to/associated with use of heparin and/or TPA   -- Unable to determine      Query created by: Fabio Mcelroy on 2021 11:47 AM    RESPONSE TEXT:    DIC and hemorrhagic shock are not due to/associated with use of heparin and/or TPA       QUERY TEXT:    JARAD (acute kidney injury) is documented in 2 procedure notes on 21, but not mentioned in the MD PN. Please clarify status of this condition:    NOTE:  If an appropriate response is not listed below, please respond with a new  note.       The patient's Clinical Indicators include:  6/25/21 Art-line procedure note: Post-procedure Diagnoses : JARAD (acute kidney injury)   6/25/21 Central line insertion procedure note: Post-procedure Diagnoses : JARAD (acute kidney injury)  Lab results:   6/16/21;  BUN 10, Creatinine 0.86, GFR >60  6/25/21;  BUN 23, Creatinine 1.90, GFR 28    Treatment: IVF boluses, Monitor lab results  Risk Factors: ARDS, Covid-19, PNA, Hemorrhagic shock, Cardiogenic shock, PEA arrest    Thank you,  Fabio Mcelroy RN, BSN  Clinical   Connect via [employer]  .  Options provided:   -- JARAD is ruled in   -- JARAD is ruled out   -- Unable to determine      Query created by: Fabio Mcelroy on 6/29/2021 12:09 PM    RESPONSE TEXT:    JARAD is ruled in       QUERY TEXT:    Please clarify the clinical relevance for the diagnostic lab findings of ALT 6425 and AST >7000.      NOTE:  If an appropriate response is not listed below, please respond with a new note.          The patient's clinical indicators include:  6/16/21 CT chest: Hepatic steatosis  Lab results:   6/19/21: Hepatitis panel negative  6/25/21: Fibrinogen 61, 145, 79; Aspartate Amino-Norton (AST) 1461, >7000; Alanine Amino-Trans (ALT) 894, 6425    Treatment: Hepatitis panel, Avoid NSAIDs, Monitor ALT & AST  Risk Factors: Covid, PNA, ARDS, DIC, Hemorrhagic shock    Thank you,  Fabio Mcelroy RN, BSN  Clinical   Connect via [employer]  .  Options provided:   -- ALT 6425 and AST >7000 lab results are clinically relevant, please document a diagnosis for this finding   -- Findings of no clinical significance   -- Unable to determine      Query created by: Fabio Mcelroy on 6/29/2021 12:38 PM    RESPONSE TEXT:    ALT 6425 and AST >7000 lab results are clinically relevant- Shock liver          Electronically signed by:  ELIZABETH BURNS DO 6/29/2021 12:40 PM

## 2025-01-22 NOTE — CARE PLAN
The patient is Unstable - High likelihood or risk of patient condition declining or worsening         Progress made toward(s) clinical / shift goals: Updated pt on plan of care. Pt currently on 15L oxy so she will need to go to high flow if she moves from the prone position. Pt understands     Patient is not progressing towards the following goals:       Mirtha RN: Pt received to spot 1, A&Ox4, ambulatory at baseline, c/o SOB and cough x1 week. Hx leukemia in remission, HTN, thrombocytopenia, splenomegaly, and ITP. Arrives on 15L nonrebreather, tachypneic, tachycardic, hypertensive. Audible crackles auscultated. Endorses midsternal chest pain. Reports having 3 episodes of diarrhea today, no nausea or vomiting. 20G IV to right AC, labs drawn and sent. Medicated as ordered. Placed on BIPAP by RT. Pt remains tachycardic and hypertensive. Rectally febrile 102.9F. Pending further workup. Report endorsed to primary RN Lola.